# Patient Record
Sex: FEMALE | Race: ASIAN | NOT HISPANIC OR LATINO | Employment: FULL TIME
[De-identification: names, ages, dates, MRNs, and addresses within clinical notes are randomized per-mention and may not be internally consistent; named-entity substitution may affect disease eponyms.]

---

## 2017-07-08 ENCOUNTER — HEALTH MAINTENANCE LETTER (OUTPATIENT)
Age: 22
End: 2017-07-08

## 2017-07-22 ENCOUNTER — HEALTH MAINTENANCE LETTER (OUTPATIENT)
Age: 22
End: 2017-07-22

## 2019-03-01 ENCOUNTER — OFFICE VISIT (OUTPATIENT)
Dept: FAMILY MEDICINE | Facility: CLINIC | Age: 24
End: 2019-03-01
Payer: COMMERCIAL

## 2019-03-01 VITALS
HEIGHT: 63 IN | RESPIRATION RATE: 16 BRPM | OXYGEN SATURATION: 99 % | WEIGHT: 114 LBS | BODY MASS INDEX: 20.2 KG/M2 | TEMPERATURE: 98.5 F | DIASTOLIC BLOOD PRESSURE: 81 MMHG | HEART RATE: 86 BPM | SYSTOLIC BLOOD PRESSURE: 113 MMHG

## 2019-03-01 DIAGNOSIS — R63.0 ANOREXIA: ICD-10-CM

## 2019-03-01 DIAGNOSIS — Z00.00 ENCOUNTER FOR ROUTINE ADULT HEALTH EXAMINATION WITHOUT ABNORMAL FINDINGS: Primary | ICD-10-CM

## 2019-03-01 PROCEDURE — 36415 COLL VENOUS BLD VENIPUNCTURE: CPT | Performed by: FAMILY MEDICINE

## 2019-03-01 PROCEDURE — 84100 ASSAY OF PHOSPHORUS: CPT | Performed by: FAMILY MEDICINE

## 2019-03-01 PROCEDURE — 99385 PREV VISIT NEW AGE 18-39: CPT | Performed by: FAMILY MEDICINE

## 2019-03-01 PROCEDURE — 83735 ASSAY OF MAGNESIUM: CPT | Performed by: FAMILY MEDICINE

## 2019-03-01 PROCEDURE — 80053 COMPREHEN METABOLIC PANEL: CPT | Performed by: FAMILY MEDICINE

## 2019-03-01 RX ORDER — DEXTROAMPHETAMINE SACCHARATE, AMPHETAMINE ASPARTATE, DEXTROAMPHETAMINE SULFATE AND AMPHETAMINE SULFATE 2.5; 2.5; 2.5; 2.5 MG/1; MG/1; MG/1; MG/1
10 TABLET ORAL 2 TIMES DAILY
COMMUNITY
End: 2023-10-24

## 2019-03-01 RX ORDER — PRAZOSIN HYDROCHLORIDE 1 MG/1
2 CAPSULE ORAL AT BEDTIME
COMMUNITY
End: 2023-10-24

## 2019-03-01 ASSESSMENT — MIFFLIN-ST. JEOR: SCORE: 1246.23

## 2019-03-01 NOTE — NURSING NOTE
"Chief Complaint   Patient presents with     Physical     initial /81 (BP Location: Left arm, Cuff Size: Adult Regular)   Pulse 86   Temp 98.5  F (36.9  C) (Oral)   Resp 16   Ht 1.6 m (5' 3\")   Wt 51.7 kg (114 lb)   LMP 02/09/2019   SpO2 99%   BMI 20.19 kg/m   Estimated body mass index is 20.19 kg/m  as calculated from the following:    Height as of this encounter: 1.6 m (5' 3\").    Weight as of this encounter: 51.7 kg (114 lb).  BP completed using cuff size: regular.  L  arm      Health Maintenance that is potentially due pending provider review:  NONE    n/a    Darian Healy ma  "

## 2019-03-01 NOTE — PROGRESS NOTES
SUBJECTIVE:   CC: Toshia Claros is an 23 year old woman who presents for preventive health visit.     Healthy Habits:    Do you get at least three servings of calcium containing foods daily (dairy, green leafy vegetables, etc.)? yes and tries    Amount of exercise or daily activities, outside of work: 3 day(s) per week    Problems taking medications regularly No    Medication side effects: No    Have you had an eye exam in the past two years? yes    Do you see a dentist twice per year? yes    Do you have sleep apnea, excessive snoring or daytime drowsiness?no    Establishing care- previously seen by pediatrician, but now for awhile.  [facility]es to [facility]- has been going for about a year this time.  Outpt program- 1x/wk for therapy, 1x/wk for dietician.  Doing 'not awesome, not horrible'.  Dx with anorexia at 17yrs, sx's started at ~13 yrs.  She's done better in the past, not doing as well for months.  More restricting.  Last hospitalized 2 1/2 yrs ago, for 4 wks[facility] [place].  Wt was ~90 lbs when she was hospitalized.  Goal- not wt goal, more intake goals.    Labs- last done likely ~2 1/2 yrs ago when she was hospitalized- had issues with her kidneys, mostly needed monitoring, no IVF or dialysis.      Exercise- goes for a walk, doesn't go to the gym.  Has over-exercised in the past, but not for awhile.    Social-   [employer]- 3rd, biology major.  Looking at health/medical field.    Meds-   Prazosin and adderall- psychiatrist [facility] [place].  Seen by him q2-3 months.  Dr. Puente.   She's likely retiring at the end of the year, so she'll work with her to find a new psychiatrist.      Today's PHQ-2 Score: No flowsheet data found.    Abuse: Current or Past(Physical, Sexual or Emotional)- No  Do you feel safe in your environment? Yes    Social History     Tobacco Use     Smoking status: Never Smoker     Smokeless tobacco: Never Used    Substance Use Topics     Alcohol use: No     If you drink alcohol do you typically have >3 drinks per day or >7 drinks per week? No                     Reviewed orders with patient.  Reviewed health maintenance and updated orders accordingly - Yes    Labs reviewed in EPIC  BP Readings from Last 3 Encounters:   03/01/19 113/81   09/14/15 102/73    Wt Readings from Last 3 Encounters:   03/01/19 51.7 kg (114 lb)   09/14/15 57.1 kg (125 lb 14.4 oz) (48 %)*     * Growth percentiles are based on CDC (Girls, 2-20 Years) data.                  There is no problem list on file for this patient.    History reviewed. No pertinent surgical history.    Social History     Tobacco Use     Smoking status: Never Smoker     Smokeless tobacco: Never Used   Substance Use Topics     Alcohol use: No     History reviewed. No pertinent family history.      Current Outpatient Medications   Medication Sig Dispense Refill     amphetamine-dextroamphetamine (ADDERALL) 10 MG tablet Take 10 mg by mouth 2 times daily       prazosin (MINIPRESS) 1 MG capsule Take 2 mg by mouth At Bedtime       No Known Allergies  No lab results found.     Mammogram not appropriate for this patient based on age.    Pertinent mammograms are reviewed under the imaging tab.  History of abnormal Pap smear: NO - age 21-29 PAP every 3 years recommended     Reviewed and updated as needed this visit by clinical staff  Tobacco  Allergies  Meds  Problems  Med Hx  Surg Hx  Fam Hx         Reviewed and updated as needed this visit by Provider  Tobacco  Allergies  Meds  Problems  Med Hx  Surg Hx  Fam Hx            ROS:  10 point ROS of systems including Constitutional, Eyes, Respiratory, Cardiovascular, Gastroenterology, Genitourinary, Integumentary, Muscularskeletal, Psychiatric were all negative except for pertinent positives noted in my HPI.      OBJECTIVE:   /81 (BP Location: Left arm, Cuff Size: Adult Regular)   Pulse 86   Temp 98.5  F (36.9  C) (Oral)  "  Resp 16   Ht 1.6 m (5' 3\")   Wt 51.7 kg (114 lb)   LMP 02/09/2019   SpO2 99%   BMI 20.19 kg/m    EXAM:  GENERAL: healthy, alert and no distress  EYES: Eyes grossly normal to inspection, PERRL and conjunctivae and sclerae normal  HENT: ear canals and TM's normal, nose and mouth without ulcers or lesions  NECK: no adenopathy, no asymmetry, masses, or scars and thyroid normal to palpation  RESP: lungs clear to auscultation - no rales, rhonchi or wheezes  BREAST: normal without masses, tenderness or nipple discharge and no palpable axillary masses or adenopathy  CV: regular rate and rhythm, normal S1 S2, no S3 or S4, no murmur, click or rub, no peripheral edema and peripheral pulses strong  ABDOMEN: soft, nontender, no hepatosplenomegaly, no masses and bowel sounds normal  MS: no gross musculoskeletal defects noted, no edema  SKIN: no suspicious lesions or rashes  NEURO: Normal strength and tone, mentation intact and speech normal  PSYCH: mentation appears normal, affect normal/bright    Diagnostic Test Results:  No results found for this or any previous visit (from the past 24 hour(s)).    ASSESSMENT/PLAN:       ICD-10-CM    1. Encounter for routine adult health examination without abnormal findings Z00.00    2. Anorexia R63.0 Comprehensive metabolic panel (BMP + Alb, Alk Phos, ALT, AST, Total. Bili, TP)     Phosphorus     Magnesium     CPE- Discussed diet, calcium and exercise.  Went over Self Breast Exam.  Thin prep pap was not done- pt declined it today, but will try and have it done next year.  She is not currently sexually active.  Eyes and Teeth or UTD or recommended f/u.  No immunizations needed today- she did not get flu shot this year, but would like to skip due to it being so late in the season.  See orders below for tests ordered and screening needed.      Anorexia- Currently at the [facility] for the past year- doing outpt program, sees therapist 1x/wk and dietician 1x/wk.  She was last " "hospitalized in ~2017, wt down to 90 lbs at that time.  She feels she's doing okay, not great.  Told she should come establish care and have labs done.  Doesn't think she's had labs done for a couple yrs- possible since around the time of her hospitalization.  Will check labs as above.  F/u q6mo if doing well/stable, sooner if lab abnls or worsening sx's.    Adderall/Prazosin- cont f/u with psychiatry.      COUNSELING:   Reviewed preventive health counseling, as reflected in patient instructions    BP Readings from Last 1 Encounters:   03/01/19 113/81     Estimated body mass index is 20.19 kg/m  as calculated from the following:    Height as of this encounter: 1.6 m (5' 3\").    Weight as of this encounter: 51.7 kg (114 lb).           reports that  has never smoked. she has never used smokeless tobacco.      Counseling Resources:  ATP IV Guidelines  Pooled Cohorts Equation Calculator  Breast Cancer Risk Calculator  FRAX Risk Assessment  ICSI Preventive Guidelines  Dietary Guidelines for Americans, 2010  USDA's MyPlate  ASA Prophylaxis  Lung CA Screening    Екатерина Cool MD  [facility]  "

## 2019-03-04 LAB
ALBUMIN SERPL-MCNC: 4.5 G/DL (ref 3.4–5)
ALP SERPL-CCNC: 58 U/L (ref 40–150)
ALT SERPL W P-5'-P-CCNC: 12 U/L (ref 0–50)
ANION GAP SERPL CALCULATED.3IONS-SCNC: 7 MMOL/L (ref 3–14)
AST SERPL W P-5'-P-CCNC: 11 U/L (ref 0–45)
BILIRUB SERPL-MCNC: 0.6 MG/DL (ref 0.2–1.3)
BUN SERPL-MCNC: 10 MG/DL (ref 7–30)
CALCIUM SERPL-MCNC: 9.4 MG/DL (ref 8.5–10.1)
CHLORIDE SERPL-SCNC: 106 MMOL/L (ref 94–109)
CO2 SERPL-SCNC: 27 MMOL/L (ref 20–32)
CREAT SERPL-MCNC: 0.72 MG/DL (ref 0.52–1.04)
GFR SERPL CREATININE-BSD FRML MDRD: >90 ML/MIN/{1.73_M2}
GLUCOSE SERPL-MCNC: 85 MG/DL (ref 70–99)
MAGNESIUM SERPL-MCNC: 2.2 MG/DL (ref 1.6–2.3)
PHOSPHATE SERPL-MCNC: 3 MG/DL (ref 2.5–4.5)
POTASSIUM SERPL-SCNC: 3.6 MMOL/L (ref 3.4–5.3)
PROT SERPL-MCNC: 7.6 G/DL (ref 6.8–8.8)
SODIUM SERPL-SCNC: 140 MMOL/L (ref 133–144)

## 2019-03-04 NOTE — RESULT ENCOUNTER NOTE
Here are your lab results from your recent visit...  -Your CMP (which includes electrolyte levels, blood sugar levels, and kidney and liver function tests) looks normal.  -Your phosphorus and magnesium levels are also normal.    Please let me know if you have any questions.  Best,   Nader Cool MD

## 2019-04-04 ENCOUNTER — OFFICE VISIT (OUTPATIENT)
Dept: FAMILY MEDICINE | Facility: CLINIC | Age: 24
End: 2019-04-04
Payer: COMMERCIAL

## 2019-04-04 VITALS
OXYGEN SATURATION: 98 % | DIASTOLIC BLOOD PRESSURE: 75 MMHG | TEMPERATURE: 98 F | RESPIRATION RATE: 14 BRPM | SYSTOLIC BLOOD PRESSURE: 110 MMHG | HEIGHT: 63 IN | BODY MASS INDEX: 20.95 KG/M2 | WEIGHT: 118.25 LBS | HEART RATE: 93 BPM

## 2019-04-04 DIAGNOSIS — N94.6 DYSMENORRHEA: Primary | ICD-10-CM

## 2019-04-04 PROBLEM — R10.2 PELVIC PAIN IN FEMALE: Status: ACTIVE | Noted: 2019-04-04

## 2019-04-04 LAB
ALBUMIN UR-MCNC: NEGATIVE MG/DL
APPEARANCE UR: CLEAR
BILIRUB UR QL STRIP: NEGATIVE
COLOR UR AUTO: YELLOW
GLUCOSE UR STRIP-MCNC: NEGATIVE MG/DL
HCG UR QL: NEGATIVE
HGB UR QL STRIP: NEGATIVE
KETONES UR STRIP-MCNC: ABNORMAL MG/DL
LEUKOCYTE ESTERASE UR QL STRIP: NEGATIVE
NITRATE UR QL: NEGATIVE
PH UR STRIP: 5.5 PH (ref 5–7)
RBC #/AREA URNS AUTO: ABNORMAL /HPF
SOURCE: ABNORMAL
SP GR UR STRIP: 1.02 (ref 1–1.03)
UROBILINOGEN UR STRIP-ACNC: 0.2 EU/DL (ref 0.2–1)
WBC #/AREA URNS AUTO: ABNORMAL /HPF

## 2019-04-04 PROCEDURE — 87491 CHLMYD TRACH DNA AMP PROBE: CPT | Performed by: FAMILY MEDICINE

## 2019-04-04 PROCEDURE — 81025 URINE PREGNANCY TEST: CPT | Performed by: FAMILY MEDICINE

## 2019-04-04 PROCEDURE — 81001 URINALYSIS AUTO W/SCOPE: CPT | Performed by: FAMILY MEDICINE

## 2019-04-04 PROCEDURE — 99214 OFFICE O/P EST MOD 30 MIN: CPT | Performed by: FAMILY MEDICINE

## 2019-04-04 ASSESSMENT — PAIN SCALES - GENERAL: PAINLEVEL: NO PAIN (0)

## 2019-04-04 ASSESSMENT — MIFFLIN-ST. JEOR: SCORE: 1265.51

## 2019-04-04 NOTE — NURSING NOTE
"Chief Complaint   Patient presents with     Abdominal Pain     lower abdomen pain     /75 (BP Location: Right arm, Patient Position: Sitting, Cuff Size: Adult Regular)   Pulse 93   Temp 98  F (36.7  C) (Oral)   Resp 14   Ht 1.6 m (5' 3\")   Wt 53.6 kg (118 lb 4 oz)   LMP 04/04/2019 (Exact Date)   SpO2 98%   Breastfeeding? No   BMI 20.95 kg/m   Estimated body mass index is 20.95 kg/m  as calculated from the following:    Height as of this encounter: 1.6 m (5' 3\").    Weight as of this encounter: 53.6 kg (118 lb 4 oz).  bp completed using cuff size: regular      Health Maintenance addressed:  NONE    n/a              "

## 2019-04-04 NOTE — RESULT ENCOUNTER NOTE
Pleasure meeting you today  Urine pregnancy test negative  Urine is clear- no signs of urinary tract infection  Chlamydia screening is send out    Pain mostly from periods  Keep menstrual calender  Proceed with ultrasound if pain is worsening- Call to schedule your imaging:  [place] or [facility] [phone]  Or [facility] [phone].    Please keep us posted with questions or concerns .      Best Regards,    Jessica Burciaga MD  [facility]  [phone]

## 2019-04-04 NOTE — PROGRESS NOTES
"  SUBJECTIVE:   Toshia Claros is a 23 year old female who presents to clinic today for the following health issues:      pelvic Pain      Duration: yesterday morning was worse, could not move- until took ibuprofen     Periods started today- normally has mild cramps     Description (location/character/radiation): pelvic bilateral        Associated flank pain: None    Intensity:  moderate    Accompanying signs and symptoms:        Fever/Chills: no        Gas/Bloating: YES- bloating       Nausea/vomitting: YES- nausea yesterday morning       Diarrhea: no        Dysuria or Hematuria: no     History (previous similar pain/trauma/previous testing): none    Precipitating or alleviating factors:       Pain worse with eating/BM/urination: no but pt mentions being lactose       Pain relieved by BM: no     Therapies tried and outcome: IBU and Aleeve    LMP: 04/04/2019    Periods irregular     Abstinence    Pap smear declined by patient    Ok to get urine tests today.      PROBLEMS TO ADD ON...    Problem list and histories reviewed & adjusted, as indicated.  Additional history: as documented    Patient Active Problem List   Diagnosis     Pelvic pain in female     No past surgical history on file.    Social History     Tobacco Use     Smoking status: Never Smoker     Smokeless tobacco: Never Used   Substance Use Topics     Alcohol use: No     No family history on file.        Reviewed and updated as needed this visit by clinical staff  Tobacco  Allergies  Meds       Reviewed and updated as needed this visit by Provider         ROS:  Constitutional, HEENT, cardiovascular, pulmonary, GI, , musculoskeletal, neuro, skin, endocrine and psych systems are negative, except as otherwise noted.    OBJECTIVE:     /75 (BP Location: Right arm, Patient Position: Sitting, Cuff Size: Adult Regular)   Pulse 93   Temp 98  F (36.7  C) (Oral)   Resp 14   Ht 1.6 m (5' 3\")   Wt 53.6 kg (118 lb 4 oz)   LMP 04/04/2019 (Exact Date)  "  SpO2 98%   Breastfeeding? No   BMI 20.95 kg/m    Body mass index is 20.95 kg/m .  GENERAL: healthy, alert and no distress  NECK: no adenopathy, no asymmetry, masses, or scars and thyroid normal to palpation  RESP: lungs clear to auscultation - no rales, rhonchi or wheezes  CV: regular rate and rhythm, normal S1 S2, no S3 or S4, no murmur, click or rub, no peripheral edema and peripheral pulses strong  ABDOMEN: soft, nontender, no hepatosplenomegaly, no masses and bowel sounds normal  : normal menstrual blood. Cervix visualized. Non bluky uterus on palpation. No cervical motion tenderness    Diagnostic Test Results:  No results found for this or any previous visit (from the past 24 hour(s)).    ASSESSMENT/PLAN:   1. Dysmenorrhea  Assessment: 22 yo female with pelvic cramping & normal mensuration  Plan urine pregnancy test is negative   Advised to keep menstrual calender and track days  Urine is clear  Screening for chlamydia & gonorrhea is negative  She reports she has not had that much cramping before.  Advised to take over the counter NSAID with meals   If more concerns ok to proceed with ultrasound pelvis  Follow up in 1 weeks for unresolved concerns    - HCG Qual, Urine ([id number])  - UA with Microscopic  - Chlamydia trachomatis PCR  - US Pelvic Complete w Transvaginal; Future      The patient indicates understanding of these issues and agrees with the plan.    Jessica Burciaga MD  [facility]

## 2019-04-07 LAB
C TRACH DNA SPEC QL NAA+PROBE: NEGATIVE
SPECIMEN SOURCE: NORMAL

## 2019-04-11 NOTE — PATIENT INSTRUCTIONS
urine pregnancy test is negative    keep menstrual calender and track days  Urine is clear- no signs of urinary tract infection   Screening for chlamydia & gonorrhea is negative    If more concerns ok to proceed with ultrasound pelvis  Call to schedule your imaging:    [place] or [facility] [phone]    [facility] [phone]    For menstrual cramping ok to take over the counter pain medications ibuprofen 600 mg three times daily as needed       Follow up in 1 weeks for unresolved concerns

## 2019-10-04 ENCOUNTER — HEALTH MAINTENANCE LETTER (OUTPATIENT)
Age: 24
End: 2019-10-04

## 2020-01-16 ENCOUNTER — MYC MEDICAL ADVICE (OUTPATIENT)
Dept: FAMILY MEDICINE | Facility: CLINIC | Age: 25
End: 2020-01-16

## 2020-01-16 NOTE — TELEPHONE ENCOUNTER
Reviewed letter.  Can you send it on to the MA when it is determined who is likely to be working with me tomorrow?  Thanks!  CW

## 2020-01-17 ENCOUNTER — TELEPHONE (OUTPATIENT)
Dept: PHYSICAL MEDICINE AND REHAB | Facility: CLINIC | Age: 25
End: 2020-01-17

## 2020-01-17 ENCOUNTER — OFFICE VISIT (OUTPATIENT)
Dept: FAMILY MEDICINE | Facility: CLINIC | Age: 25
End: 2020-01-17
Payer: COMMERCIAL

## 2020-01-17 VITALS
RESPIRATION RATE: 14 BRPM | HEIGHT: 63 IN | BODY MASS INDEX: 20.02 KG/M2 | DIASTOLIC BLOOD PRESSURE: 78 MMHG | WEIGHT: 113 LBS | TEMPERATURE: 97.9 F | OXYGEN SATURATION: 100 % | HEART RATE: 104 BPM | SYSTOLIC BLOOD PRESSURE: 109 MMHG

## 2020-01-17 DIAGNOSIS — M24.9 HYPERMOBILE JOINTS: ICD-10-CM

## 2020-01-17 DIAGNOSIS — M25.50 MULTIPLE JOINT PAIN: Primary | ICD-10-CM

## 2020-01-17 DIAGNOSIS — Z23 FLU VACCINE NEED: ICD-10-CM

## 2020-01-17 DIAGNOSIS — R63.0 ANOREXIA: ICD-10-CM

## 2020-01-17 DIAGNOSIS — F43.10 PTSD (POST-TRAUMATIC STRESS DISORDER): ICD-10-CM

## 2020-01-17 PROCEDURE — 90686 IIV4 VACC NO PRSV 0.5 ML IM: CPT | Performed by: FAMILY MEDICINE

## 2020-01-17 PROCEDURE — 99214 OFFICE O/P EST MOD 30 MIN: CPT | Mod: 25 | Performed by: FAMILY MEDICINE

## 2020-01-17 PROCEDURE — 90471 IMMUNIZATION ADMIN: CPT | Performed by: FAMILY MEDICINE

## 2020-01-17 ASSESSMENT — MIFFLIN-ST. JEOR: SCORE: 1236.69

## 2020-01-17 ASSESSMENT — PAIN SCALES - GENERAL: PAINLEVEL: MILD PAIN (3)

## 2020-01-17 NOTE — TELEPHONE ENCOUNTER
Health Call Center    Phone Message    May a detailed message be left on voicemail: yes    Reason for Call: Other: Please call pt to schedule her with a provider who will focus on joint/muscular skeleton issues v. stroke issues.  pt needs no help in stroke and she wanted the team to know that. Please call pt to schedule this consult appt for her. Thank you.     Action Taken: Message routed to:  [facility]

## 2020-01-17 NOTE — PROGRESS NOTES
Subjective   Toshia Claros is a 24 year old female who presents to clinic today for the following health issues:    HPI   Musculoskeletal problem/pain    Duration: on and off for years, but has gotten worse recently     Description  Location: knees, hips, shoulders    Intensity:  moderate    Accompanying signs and symptoms: none    History  Previous similar problem: YES  Previous evaluation:  none    Precipitating or alleviating factors:  Trauma or overuse: no   Aggravating factors include: on feet for a long period     Therapies tried and outcome: acetaminophen and Ibuprofen    She's been having increasing joint pain.  Mild/mod on normal days, and then if she 'overdoes it', she's up at night, unable to fall asleep as it hurts too bad.  Ibuprofen trials, but doesn't help much.    Usual exercise- goes for walks, stretches.    Avoids high-impact activity, partly because of joints.  She did ml-running in HS, but had knee/ankle pain.    Sx's of pain in joints of knees, hips, shoulders, sometimes her wrists.  Both sides, but left side is more frequent, jts seem more hypermobile in general.  Pain tends to be worse if she's on her feet more- in knees and hips.  About the same throughout the day otherwise.  No am stiffness.  No redness or swelling.  Pain definitely located in her jts, not muscles or bones.    Hypermobility- she's always had hypermobile jts. Joints just seem to bend too far, since she was a child.   She can pull her shoulders down and seemingly out of the socket and back in easily- doesn't hurt.  Also sometimes feels her hip goes out of the socket when she's walking, but then easily goes back in.  No painful dislocations, or inability to get it back in.    H/o R ankle fx- ~ age 21.  'Just started hurting'.  Stress fracture.  During eating disorder sx's- moderate sx's, not the most severe, was having periods at the time.    Therapist wants her to ask if sleeping on hard floors would impact it.  She has  "been sleeping on hard floors for over a year.  She doesn't notice that sx's are worse when she's on the floor or in the morning.  She has a bed and couch, just doesn't want to sleep in them.    Of note, her psychiatrist retired.  Has some refills.  Needs to call to establish care with another psychiatrist.      Patient Active Problem List   Diagnosis     Pelvic pain in female     PTSD (post-traumatic stress disorder)     Anorexia     History reviewed. No pertinent surgical history.    Social History     Tobacco Use     Smoking status: Never Smoker     Smokeless tobacco: Never Used   Substance Use Topics     Alcohol use: No     Family History   Adopted: Yes         Current Outpatient Medications   Medication Sig Dispense Refill     amphetamine-dextroamphetamine (ADDERALL) 10 MG tablet Take 10 mg by mouth 2 times daily       prazosin (MINIPRESS) 1 MG capsule Take 2 mg by mouth At Bedtime       No Known Allergies  Recent Labs   Lab Test 03/01/19  1601   ALT 12   CR 0.72   GFRESTIMATED >90   GFRESTBLACK >90   POTASSIUM 3.6      BP Readings from Last 3 Encounters:   01/17/20 109/78   04/04/19 110/75   03/01/19 113/81    Wt Readings from Last 3 Encounters:   01/17/20 51.3 kg (113 lb)   04/04/19 53.6 kg (118 lb 4 oz)   03/01/19 51.7 kg (114 lb)                      Reviewed and updated as needed this visit by Provider  Tobacco  Allergies  Meds  Problems  Med Hx  Surg Hx  Fam Hx         Review of Systems   ROS COMP: Constitutional, HEENT, cardiovascular, pulmonary, gi and gu systems are negative, except as otherwise noted.        Objective    /78 (BP Location: Left arm, Patient Position: Sitting, Cuff Size: Adult Regular)   Pulse 104   Temp 97.9  F (36.6  C) (Oral)   Resp 14   Ht 1.6 m (5' 3\")   Wt 51.3 kg (113 lb)   LMP 01/10/2020 (Approximate)   SpO2 100%   BMI 20.02 kg/m    Body mass index is 20.02 kg/m .  Physical Exam   GENERAL APPEARANCE: healthy, alert and no distress  EYES: Eyes grossly normal " to inspection, PERRL and conjunctivae and sclerae normal  HENT: ear canals and TM's normal and nose and mouth without ulcers or lesions  NECK: no adenopathy, no asymmetry, masses, or scars and thyroid normal to palpation  RESP: lungs clear to auscultation - no rales, rhonchi or wheezes  CV: regular rates and rhythm, normal S1 S2, no S3 or S4 and no murmur, click or rub  MS: extremities normal- no gross deformities noted  ORTHO: shoulders normal to inspection, though she is able to lower humerus head downward ~4cm below usual placement, normal ROM of shoulders, hips and knees, with normal strength.  No hypermobility signs on hands.  SKIN: no suspicious lesions or rashes        Assessment & Plan       ICD-10-CM    1. Multiple joint pain M25.50 PHYSIATRY REFERRAL   2. Hypermobile joints M24.9    3. Flu vaccine need Z23 HC FLU VAC PRESRV FREE QUAD SPLIT VIR > 6 MONTHS IM   4. PTSD (post-traumatic stress disorder) F43.10    5. Anorexia R63.0      Joint pains/hypermobility- Long-standing sx's of hypermobile joints, now with hip/knee/shoulder pains, L>R, which she feels is mostly related to their hypermobility.  No excessive exercise (mostly walking), avoids partly due to this history.  No inflammatory-like sx's (and unknown fam h/o), so will not get rheumatologic labs.  Will send on to [facility] for evaluation and to discuss if there are any treatment options.    Flu vaccine- Risks/benefits discussed, given today.     PTSD/Anorexia- pt continues to see therapist and dietician at [facility].  Her psychiatrist recently retired, though, so needs to call to establish care with a new one.    Return in about 4 months (around 5/17/2020) for Physical-but return soon if symptoms not improving.    Екатерина Cool MD  [facility]

## 2020-01-31 ENCOUNTER — TRANSFERRED RECORDS (OUTPATIENT)
Dept: HEALTH INFORMATION MANAGEMENT | Facility: CLINIC | Age: 25
End: 2020-01-31

## 2020-01-31 NOTE — TELEPHONE ENCOUNTER
Patient verbalized that she would like a provider to address her joint problems and is not interested in any type of Physical medicine or rehabilitation. Offered patient suggestions of Rheumatology, Orthopedics and possibly the pain clinic. Encouraged her to discuss these options with her Primary provider. Patient in agreement with the plan.

## 2020-03-19 ENCOUNTER — TELEPHONE (OUTPATIENT)
Dept: FAMILY MEDICINE | Facility: CLINIC | Age: 25
End: 2020-03-19

## 2020-03-19 DIAGNOSIS — M25.50 MULTIPLE JOINT PAIN: Primary | ICD-10-CM

## 2020-03-19 DIAGNOSIS — M24.9 HYPERMOBILE JOINTS: ICD-10-CM

## 2020-03-19 NOTE — TELEPHONE ENCOUNTER
- Patient is calling to get a referral to Rheumatology. She called to make appt at [facility] she was told see need to see Rheumatology. She has made an appointment with [facility] and they are asking for a referral to be faxed to them. Please advise. Thanks    Itzel Espinosa  Referral coordinator

## 2020-04-08 ENCOUNTER — TRANSFERRED RECORDS (OUTPATIENT)
Dept: HEALTH INFORMATION MANAGEMENT | Facility: CLINIC | Age: 25
End: 2020-04-08

## 2020-04-08 LAB
ALT SERPL-CCNC: 10 IU/L (ref 5–35)
AST SERPL-CCNC: 14 U/L (ref 5–34)
CREATININE (EXTERNAL): 0.9 MG/DL (ref 0.5–1.3)

## 2020-05-04 ENCOUNTER — THERAPY VISIT (OUTPATIENT)
Dept: PHYSICAL THERAPY | Facility: CLINIC | Age: 25
End: 2020-05-04
Payer: COMMERCIAL

## 2020-05-04 DIAGNOSIS — M25.511 SHOULDER PAIN, BILATERAL: ICD-10-CM

## 2020-05-04 DIAGNOSIS — M25.552 PAIN OF BOTH HIP JOINTS: ICD-10-CM

## 2020-05-04 DIAGNOSIS — M25.551 PAIN OF BOTH HIP JOINTS: ICD-10-CM

## 2020-05-04 DIAGNOSIS — M25.512 SHOULDER PAIN, BILATERAL: ICD-10-CM

## 2020-05-04 PROCEDURE — 97112 NEUROMUSCULAR REEDUCATION: CPT | Mod: GP | Performed by: PHYSICAL THERAPIST

## 2020-05-04 PROCEDURE — 97161 PT EVAL LOW COMPLEX 20 MIN: CPT | Mod: GP | Performed by: PHYSICAL THERAPIST

## 2020-05-04 PROCEDURE — 97110 THERAPEUTIC EXERCISES: CPT | Mod: GP | Performed by: PHYSICAL THERAPIST

## 2020-05-04 NOTE — PROGRESS NOTES
[facility] Initial Evaluation  Subjective:  The history is provided by the patient.   Therapist Generated HPI Evaluation  Problem details: Pt presents to PT with a chief complaint of chronic bilateral shoulder and hip pain. Pt reports a chronic history of joint pain and hypermobility since childhood, but reports worsening sxs over the past years. Pt evaluated by rheumatology and diagnosed with hypermobility syndrome and referred to PT. Shoulder pain reported to be worse with reaching overhead, lifting, pushing/pulling, and laying on involved side. Hip pain worse with prolonged standing/walking. Pt reports a chronic hx of shoulder subluxations. .         Type of problem:  Bilateral shoulders.    This is a chronic condition.  Condition occurred with:  Unknown cause.  Where condition occurred: for unknown reasons.  Patient reports pain:  Lateral.  Pain is described as aching and is intermittent.  Pain radiates to:  Upper arm. Pain is worse in the P.M..  Since onset symptoms are unchanged.  Associated symptoms:  Dislocating/subluxing, loss of strength and catching. Symptoms are exacerbated by carrying, lying on extremity, using arm at shoulder level, using arm overhead and lifting  and relieved by NSAID's.      Restrictions due to condition include:  Working in normal job without restrictions.  Barriers include:  None as reported by patient.    Therapist Generated HPI Evaluation         Type of problem:  Bilateral hips.    This is a chronic condition.  Condition occurred with:  Insidious onset.  Where condition occurred: for unknown reasons.  Patient reports pain:  Anterior and lateral (pelvis, iliac crest ).  Pain is described as aching and is intermittent.  Pain is worse in the P.M..  Since onset symptoms are unchanged.  Associated symptoms:  Loss of strength and buckling/giving out. Symptoms are exacerbated by bending/squatting, ascending stairs, descending stairs, standing and walking  and  relieved by NSAID's.      Restrictions due to condition include:  Working in normal job without restrictions.  Barriers include:  None as reported by patient.    Patient Health History  Symptom: Hypermobility; EDS.     Date of Onset: childhood; MD visit 04/2020.      Pain is reported as 4/10 on pain scale.  General health as reported by patient is good.  Pertinent medical history includes: depression and mental illness.   Red flags:  None as reported by patient.  Medical allergies: none.   Surgeries include:  None.    Current medications:  None.    Occupation: student.   Primary job tasks include:  Computer work and prolonged sitting.                                    Objective:  System                   Shoulder Evaluation:  ROM:  AROM:  normal (excessive shoulder elevation bilaterally )                                  Strength:    Flexion: Left:4+/5    Pain: +    Right: 4+/5      Pain:  +    Abduction:  Left: 4+/5   Pain:+    Right: 4+/5      Pain:+    Internal Rotation:  Left:5-/5     Pain:    Right: 5-/5     Pain:  External Rotation:   Left:4/5     Pain:   Right:4/5     Pain:            Stability Testing:    Left shoulder stability positive testing:  Sulcus sign  Right shoulder stability positive testing:Sulcus sign      Mobility Tests:    Glenohumeral anterior left:  Hypermobile  Glenohumeral anterior right:  Hypermobile  Glenohumeral posterior left:  Hypermobile  Glenohumeral posterior right:  Hypermobile                                  Hip Evaluation  HIP AROM:  AROM:    Left Hip:     Normal    Right Hip:   Normal                  Hip PROM:              External Rotation: Left: 90    Right: 90              Hip Strength:    Flexion:   Left: 4+/5   +  Pain:  Right: 4+/5   +  Pain:                    Extension:  Left: 5-/5  Pain:Right: 5-/5    Pain:    Abduction:  Left: 4/5     Pain:Right: 4/5    Pain:                                 General     ROS    Assessment/Plan:    Patient is a 24 year old female with  both sides shoulder and both sides hip complaints.    Patient has the following significant findings with corresponding treatment plan.                Diagnosis 1:  Hypermobility Syndrome  Therapy Evaluation Codes:   1) History comprised of:   Personal factors that impact the plan of care:      Past/current experiences.    Comorbidity factors that impact the plan of care are:      Depression and Mental illness.     Medications impacting care: Anti-depressant and Anti-inflammatory.  2) Examination of Body Systems comprised of:   Body structures and functions that impact the plan of care:      Hip and Shoulder.   Activity limitations that impact the plan of care are:      Dressing, Lifting, Squatting/kneeling, Stairs and Standing.  3) Clinical presentation characteristics are:   Evolving/Changing.  4) Decision-Making    Moderate complexity using standardized patient assessment instrument and/or measureable assessment of functional outcome.  Cumulative Therapy Evaluation is: Moderate complexity.    Previous and current functional limitations:  (See Goal Flow Sheet for this information)    Short term and Long term goals: (See Goal Flow Sheet for this information)     Communication ability:  Patient appears to be able to clearly communicate and understand verbal and written communication and follow directions correctly.  Treatment Explanation - The following has been discussed with the patient:   RX ordered/plan of care  Anticipated outcomes  Possible risks and side effects  This patient would benefit from PT intervention to resume normal activities.   Rehab potential is good.    Frequency:  1 X week, once daily  Duration:  for 6 weeks tapering to 2 x month for 1 month  Discharge Plan:  Achieve all LTG.  Independent in home treatment program.  Reach maximal therapeutic benefit.    Please refer to the daily flowsheet for treatment today, total treatment time and time spent performing 1:1 timed codes.

## 2020-05-11 ENCOUNTER — TRANSFERRED RECORDS (OUTPATIENT)
Dept: HEALTH INFORMATION MANAGEMENT | Facility: CLINIC | Age: 25
End: 2020-05-11

## 2020-05-18 ENCOUNTER — TRANSFERRED RECORDS (OUTPATIENT)
Dept: HEALTH INFORMATION MANAGEMENT | Facility: CLINIC | Age: 25
End: 2020-05-18

## 2020-05-18 LAB
ALT SERPL-CCNC: 17 U/L (ref 0–78)
AST SERPL-CCNC: 14 U/L (ref 0–37)
CREAT SERPL-MCNC: 0.91 MG/DL (ref 0.6–1.02)
GFR SERPL CREATININE-BSD FRML MDRD: >60 ML/MIN/1.73M2
GLUCOSE SERPL-MCNC: 90 MG/DL (ref 74–100)
POTASSIUM SERPL-SCNC: 3.8 MMOL/L (ref 3.5–5.1)
TSH SERPL-ACNC: 3.02 UIU/ML (ref 0.36–5)

## 2020-05-20 ENCOUNTER — VIRTUAL VISIT (OUTPATIENT)
Dept: PHYSICAL THERAPY | Facility: CLINIC | Age: 25
End: 2020-05-20
Payer: COMMERCIAL

## 2020-05-20 DIAGNOSIS — M25.551 PAIN OF BOTH HIP JOINTS: ICD-10-CM

## 2020-05-20 DIAGNOSIS — M25.512 SHOULDER PAIN, BILATERAL: ICD-10-CM

## 2020-05-20 DIAGNOSIS — M25.511 SHOULDER PAIN, BILATERAL: ICD-10-CM

## 2020-05-20 DIAGNOSIS — M25.552 PAIN OF BOTH HIP JOINTS: ICD-10-CM

## 2020-05-20 PROCEDURE — 97110 THERAPEUTIC EXERCISES: CPT | Mod: 95 | Performed by: PHYSICAL THERAPIST

## 2020-05-20 NOTE — PROGRESS NOTES
"Physical Therapy Virtual Follow Up Visit      The patient has been notified of following:     \"This virtual visit will be conducted between you and your provider. We have found that certain health care needs can be provided without the need for physical presence.  This service lets us provide the care you need with a virtual visit.\"    Due to external, as well as internal [facility] management of the COVID-19 Virus, Toshia Claros was not seen in our clinic.  As a substitution, we implemented a virtual visit to manage this patient's condition utilizing the "[employer]x virtual visit platform via the patient s existing code.  The provider, Margoth Bar, reviewed the patient's chart, PTRx prescription, and spoke with the patient to determine the following telemedicine visit is appropriate and effective for the patient's care.    The following type of visit was completed:   Video Visit:  The "[employer]x platform uses a synchronous HIPAA compliant video stream for this patient encounter.        S: Toshia Claros is a 25 year old female. Connected virtually on the PTRx platform to discuss their condition/progress. They noted improvements in strength and control in her exercises.  They noted ongoing pain or limitations with popping in her hips.     Current pain level: 3-4/10      O: Patient demonstrated a tendency to exaggerate posterior pelvic tilting when performing abdominal brace exercises. She was able to maintain a more neutral spine with cues.     PTRx Content from today's visit:  Exercise Name: Prone Scapula I, Sets: 2 set  - Reps: 10 reps  - Sessions: 1x day  Exercise Name: Bilateral Rotation With Theraband, Sets: 2 set  - Reps: 10 reps  - Sessions: 1x day, Notes: or sitting in a chair   Exercise Name: Supine Ceiling Punch, Sets: 2 - Reps: 10 reps  - Sessions: 1, Notes: angle slightly toward your hips, slowly lift and slowly lower   Exercise Name: Abdominal Brace Transverse Abdominis, Sets: 1 - Reps: 5 of 5 second " holds - Sessions: 1 x day, Notes: Focus on maintaining neutral pelvis and spine while activating your abdominals. Coordinate your breathing pattern  Exercise Name: Supine Abdominal Exercise #1 (Arm Extension), Sets: 2 - Reps: 10 reps - Sessions: 1  Exercise Name: Bridging #1, Sets: 2 - Reps: 15 reps, 5 sec holds  - Sessions: 1x day   Exercise Name: All 4s Rock Forward and Backward, Sets: 2 - Reps: 10, Notes: 1) press into your thumb and pointer finger to unlock your elbows  2) belly button to spine  3) shift forward and backward 2 inches  Exercise Name: Knee Bends, Sets: 2 - 10 Not on HEP due to crepitous in knees    A:   Patient is progressing as expected.  Response to therapy has shown an improvement in  muscle control      P: Patient will continue with the exercise program assigned on their PTRx code and will add the following measures to manage their pain/condition: all 4's rock forward     Current treatment program is being advanced to more complex exercises.      Virtual visit contact time    Time of service began: 9:23 AM  Time of service ended: 10:03 AM  Total Time for set up, visit, and documentation: 40 minutes    Payor: MEDICA / Plan: MEDICA CHOICE / Product Type: Indemnity /   .  Procedure Code/s   Therapeutic Exercise (05757): 40 minutes    I have reviewed the note as documented above.  This accurately captures the substance of my conversation with the patient.  Provider location: [place], [place] ([facility]/State)  Patient location: [place], [place]

## 2020-06-03 ENCOUNTER — VIRTUAL VISIT (OUTPATIENT)
Dept: PHYSICAL THERAPY | Facility: CLINIC | Age: 25
End: 2020-06-03
Payer: COMMERCIAL

## 2020-06-03 DIAGNOSIS — M25.551 PAIN OF BOTH HIP JOINTS: ICD-10-CM

## 2020-06-03 DIAGNOSIS — M25.552 PAIN OF BOTH HIP JOINTS: ICD-10-CM

## 2020-06-03 DIAGNOSIS — M25.511 SHOULDER PAIN, BILATERAL: ICD-10-CM

## 2020-06-03 DIAGNOSIS — M25.512 SHOULDER PAIN, BILATERAL: ICD-10-CM

## 2020-06-03 PROCEDURE — 97110 THERAPEUTIC EXERCISES: CPT | Mod: 95 | Performed by: PHYSICAL THERAPIST

## 2020-06-03 NOTE — PROGRESS NOTES
"Physical Therapy Virtual Follow Up Visit      The patient has been notified of following:     \"This virtual visit will be conducted between you and your provider. We have found that certain health care needs can be provided without the need for physical presence.  This service lets us provide the care you need with a virtual visit.\"    Due to external, as well as internal [facility] management of the COVID-19 Virus, Toshia Claros was not seen in our clinic.  As a substitution, we implemented a virtual visit to manage this patient's condition utilizing the [employer]x virtual visit platform via the patient s existing code.  The provider, Margoth Bar, reviewed the patient's chart, PTRx prescription, and spoke with the patient to determine the following telemedicine visit is appropriate and effective for the patient's care.    The following type of visit was completed:   Video Visit:  The [employer] platform uses a synchronous HIPAA compliant video stream for this patient encounter.        S: Toshia Claros is a 25 year old female. Connected virtually on the PTRx platform to discuss their condition/progress. They noted improvements in muscle control.  They noted ongoing pain or limitations with avoiding hyper extension of knees and elbows during weight bearing.     Current pain level: 3/10      O: Patient demonstrated scapular winging with fatigue during prone T drills     PTRx Content from today's visit:  Exercise Name: Prone Scapula T, Sets: 2 - Reps: 10 (working up to 20) - Sessions: 1  Exercise Name: Bilateral Rotation With Theraband, Sets: 2 set  - Reps: 10 reps  - Sessions: 1x day, Notes: or sitting in a chair   Exercise Name: Reverse Pendulum Supine or Sidelying, Sets: 2 - Reps: 6 in each patter - up/down, in and out, circles in each direction, Notes: punch slightly toward the ceiling  Exercise Name: Standing Fire Hydrant, Sets: 2 - Reps: 6 of each pattern, Notes: pillow on wall - make the same patterns as in " the shoulder circles  Exercise Name: Abdominal Brace Transverse Abdominis, Sets: 1 - Reps: 5 of 5 second holds - Sessions: 1 x day, Notes: Focus on maintaining neutral pelvis and spine while activating your abdominals. Coordinate your breathing pattern  Exercise Name: Supine Abdominal Exercise #1 (Arm Extension), Sets: 2 - Reps: 10 reps - Sessions: 1  Exercise Name: Bridging #1, Sets: 2 - Reps: 15 reps, 5 sec holds  - Sessions: 1x day , Notes: press lightly in your hands to protect your neck  Exercise Name: All 4s Rock Forward and Backward, Sets: 2 - Reps: 10, Notes: 1) press into your thumb and pointer finger to unlock your elbows  2) belly button to spine  3) shift forward and backward 2 inches  Exercise Name: Knee Bends, Sets: 2 - Reps: 20 reps - Sessions: 1x/day or every other day, Notes: Holding onto the sink or chair    A:   Patient is progressing as expected.  Response to therapy has shown an improvement in  strength, proprioception and muscle control      P: Patient will continue with the exercise program assigned on their PTRx code and will add the following measures to manage their pain/condition: prone T's, reverse pendulums, hip proprioception     Current treatment program is being advanced to more complex exercises.      Virtual visit contact time    Time of service began: 9:23 AM  Time of service ended: 10:03 AM  Total Time for set up, visit, and documentation: 42 minutes    Payor: MEDICA / Plan: MEDICA CHOICE / Product Type: Indemnity /   .  Procedure Code/s   Therapeutic Exercise (79432): 40 minutes    I have reviewed the note as documented above.  This accurately captures the substance of my conversation with the patient.  Provider location: [place], [place] ([facility]/State)  Patient location: [place], [place]

## 2020-06-17 ENCOUNTER — VIRTUAL VISIT (OUTPATIENT)
Dept: PHYSICAL THERAPY | Facility: CLINIC | Age: 25
End: 2020-06-17
Payer: COMMERCIAL

## 2020-06-17 DIAGNOSIS — M25.551 PAIN OF BOTH HIP JOINTS: ICD-10-CM

## 2020-06-17 DIAGNOSIS — M25.511 SHOULDER PAIN, BILATERAL: ICD-10-CM

## 2020-06-17 DIAGNOSIS — M25.552 PAIN OF BOTH HIP JOINTS: ICD-10-CM

## 2020-06-17 DIAGNOSIS — M25.512 SHOULDER PAIN, BILATERAL: ICD-10-CM

## 2020-06-17 PROCEDURE — 97110 THERAPEUTIC EXERCISES: CPT | Mod: 95 | Performed by: PHYSICAL THERAPIST

## 2020-06-17 NOTE — PROGRESS NOTES
"Physical Therapy Virtual Follow Up Visit      The patient has been notified of following:     \"This virtual visit will be conducted between you and your provider. We have found that certain health care needs can be provided without the need for physical presence.  This service lets us provide the care you need with a virtual visit.\"    Due to external, as well as internal [facility] management of the COVID-19 Virus, Toshia Claros was not seen in our clinic.  As a substitution, we implemented a virtual visit to manage this patient's condition utilizing the P[employer]x virtual visit platform via the patient s existing code.  The provider, Margoth Bar, reviewed the patient's chart, PTRx prescription, and spoke with the patient to determine the following telemedicine visit is appropriate and effective for the patient's care.    The following type of visit was completed:   Video Visit:  The [employer] platform uses a synchronous HIPAA compliant video stream for this patient encounter.        S: Toshia Claros is a 25 year old female. Connected virtually on the PTRx platform to discuss their condition/progress. They noted improvements in muscle control.  They noted ongoing pain or limitations with her new job. She notices she tends to lock her joints when staying in one position for an extended period of time. She Is open to trying kinesiotape to assist at her knees and elbows for better awareness..     Current pain level: 2/10      O: Patient demonstrated moderate scapular winging with quadruped exercises. Able to control during cat/cow but needs to do rockforward and back at the counter to avoid winging.     PTRx Content from today's visit:  Exercise Name: Prone Scapula T, Sets: 2 - Reps: 10 (working up to 20) - Sessions: 1, Notes: you did 16 on 6/17  Exercise Name: Bilateral Rotation With Theraband, Sets: 2 set  - Reps: 10 reps  - Sessions: 1x day, Notes: or sitting in a chair   Exercise Name: All 4s Cat Cow, Sets: " 2 - Reps: 10 , Notes: keep a slight bend in your elbow  You can do this on the at the counter for better control.  Exercise Name: Reverse Pendulum Supine or Sidelying, Sets: 2 - Reps: 6 in each patter - up/down, in and out, circles in each direction, Notes: punch slightly toward the ceiling  Exercise Name: Standing Fire Hydrant, Sets: 2 - Reps: 6 of each pattern, Notes: pillow on wall - make the same patterns as in the shoulder circles  Slight bend in your standing knee  Exercise Name: Abdominal Brace Transverse Abdominis, Sets: 1 - Reps: 5 of 5 second holds - Sessions: 1 x day, Notes: Focus on maintaining neutral pelvis and spine while activating your abdominals. Coordinate your breathing pattern  Exercise Name: Supine Abdominal Exercise #1 (Arm Extension), Sets: 2 - Reps: 10 reps - Sessions: 1, Notes: Add alternating knee kicks on good days  Exercise Name: Bridging #1, Sets: 2 - Reps: 15 reps, 5 sec holds  - Sessions: 1x day , Notes: press lightly in your hands to protect your neck  Exercise Name: All 4s Rock Forward and Backward, Sets: 2 - Reps: 10, Notes: 1) press into your thumb and pointer finger to unlock your elbows  2) belly button to spine  3) shift forward and backward 2 inches  You can do this on the at the counter for better control.  Exercise Name: Knee Bends, Sets: 2 - Reps: 20 reps - Sessions: 1x/day or every other day, Notes: Holding onto the sink or chair    A:   Patient is progressing as expected.  Response to therapy has shown an improvement in  muscle control      P: Patient will continue with the exercise program assigned on their PTRx code and will add the following measures to manage their pain/condition: cat/cow     Current treatment program is being advanced to more complex exercises.      Virtual visit contact time    Time of service began: 4:24 PM  Time of service ended: 5:05 PM  Total Time for set up, visit, and documentation: 43 minutes    Payor: MEDICA / Plan: MEDICA CHOICE / Product  Type: Indemnity /   .  Procedure Code/s   Therapeutic Exercise (08597): 41 minutes    I have reviewed the note as documented above.  This accurately captures the substance of my conversation with the patient.  Provider location: [place], [place] ([facility]/State)  Patient location: [place], [place]

## 2020-07-02 ENCOUNTER — TRANSFERRED RECORDS (OUTPATIENT)
Dept: HEALTH INFORMATION MANAGEMENT | Facility: CLINIC | Age: 25
End: 2020-07-02

## 2020-07-02 ENCOUNTER — THERAPY VISIT (OUTPATIENT)
Dept: PHYSICAL THERAPY | Facility: CLINIC | Age: 25
End: 2020-07-02
Payer: COMMERCIAL

## 2020-07-02 DIAGNOSIS — M25.552 PAIN OF BOTH HIP JOINTS: ICD-10-CM

## 2020-07-02 DIAGNOSIS — M25.511 SHOULDER PAIN, BILATERAL: ICD-10-CM

## 2020-07-02 DIAGNOSIS — M25.551 PAIN OF BOTH HIP JOINTS: ICD-10-CM

## 2020-07-02 DIAGNOSIS — M25.512 SHOULDER PAIN, BILATERAL: ICD-10-CM

## 2020-07-02 PROCEDURE — 97112 NEUROMUSCULAR REEDUCATION: CPT | Mod: GP | Performed by: PHYSICAL THERAPIST

## 2020-07-02 PROCEDURE — 97110 THERAPEUTIC EXERCISES: CPT | Mod: GP | Performed by: PHYSICAL THERAPIST

## 2020-07-07 ENCOUNTER — HOSPITAL ENCOUNTER (OUTPATIENT)
Dept: NUCLEAR MEDICINE | Facility: CLINIC | Age: 25
Setting detail: NUCLEAR MEDICINE
Discharge: HOME | End: 2020-07-07
Attending: INTERNAL MEDICINE | Admitting: INTERNAL MEDICINE
Payer: COMMERCIAL

## 2020-07-07 ENCOUNTER — THERAPY VISIT (OUTPATIENT)
Dept: PHYSICAL THERAPY | Facility: CLINIC | Age: 25
End: 2020-07-07
Payer: COMMERCIAL

## 2020-07-07 DIAGNOSIS — M25.512 SHOULDER PAIN, BILATERAL: ICD-10-CM

## 2020-07-07 DIAGNOSIS — M25.551 PAIN OF BOTH HIP JOINTS: ICD-10-CM

## 2020-07-07 DIAGNOSIS — Z86.59 HISTORY OF EATING DISORDER: ICD-10-CM

## 2020-07-07 DIAGNOSIS — R63.4 WEIGHT LOSS: ICD-10-CM

## 2020-07-07 DIAGNOSIS — E73.9 LACTOSE INTOLERANCE: ICD-10-CM

## 2020-07-07 DIAGNOSIS — Q79.61 EHLERS-DANLOS SYNDROME, CLASSIC TYPE: ICD-10-CM

## 2020-07-07 DIAGNOSIS — M25.511 SHOULDER PAIN, BILATERAL: ICD-10-CM

## 2020-07-07 DIAGNOSIS — K59.00 CONSTIPATION, UNSPECIFIED CONSTIPATION TYPE: ICD-10-CM

## 2020-07-07 DIAGNOSIS — R68.81 EARLY SATIETY: ICD-10-CM

## 2020-07-07 DIAGNOSIS — M25.552 PAIN OF BOTH HIP JOINTS: ICD-10-CM

## 2020-07-07 PROCEDURE — 97110 THERAPEUTIC EXERCISES: CPT | Mod: GP | Performed by: PHYSICAL THERAPIST

## 2020-07-07 PROCEDURE — A9541 TC99M SULFUR COLLOID: HCPCS | Performed by: INTERNAL MEDICINE

## 2020-07-07 PROCEDURE — 97112 NEUROMUSCULAR REEDUCATION: CPT | Mod: GP | Performed by: PHYSICAL THERAPIST

## 2020-07-07 PROCEDURE — 78264 GASTRIC EMPTYING IMG STUDY: CPT

## 2020-07-07 PROCEDURE — 34300033 ZZH RX 343: Performed by: INTERNAL MEDICINE

## 2020-07-07 RX ADMIN — Medication 2 MILLICURIE: at 10:00

## 2020-07-08 ENCOUNTER — TRANSFERRED RECORDS (OUTPATIENT)
Dept: HEALTH INFORMATION MANAGEMENT | Facility: CLINIC | Age: 25
End: 2020-07-08

## 2020-07-13 PROBLEM — Q79.60 EHLERS-DANLOS SYNDROME: Status: ACTIVE | Noted: 2020-07-13

## 2020-07-22 ENCOUNTER — THERAPY VISIT (OUTPATIENT)
Dept: PHYSICAL THERAPY | Facility: CLINIC | Age: 25
End: 2020-07-22
Payer: COMMERCIAL

## 2020-07-22 DIAGNOSIS — M25.551 PAIN OF BOTH HIP JOINTS: ICD-10-CM

## 2020-07-22 DIAGNOSIS — M25.552 PAIN OF BOTH HIP JOINTS: ICD-10-CM

## 2020-07-22 DIAGNOSIS — M25.511 SHOULDER PAIN, BILATERAL: ICD-10-CM

## 2020-07-22 DIAGNOSIS — M25.512 SHOULDER PAIN, BILATERAL: ICD-10-CM

## 2020-07-22 PROCEDURE — 97110 THERAPEUTIC EXERCISES: CPT | Mod: GP | Performed by: PHYSICAL THERAPIST

## 2020-07-22 PROCEDURE — 97112 NEUROMUSCULAR REEDUCATION: CPT | Mod: GP | Performed by: PHYSICAL THERAPIST

## 2020-08-06 ENCOUNTER — THERAPY VISIT (OUTPATIENT)
Dept: PHYSICAL THERAPY | Facility: CLINIC | Age: 25
End: 2020-08-06
Payer: COMMERCIAL

## 2020-08-06 DIAGNOSIS — M25.551 PAIN OF BOTH HIP JOINTS: ICD-10-CM

## 2020-08-06 DIAGNOSIS — M25.512 SHOULDER PAIN, BILATERAL: ICD-10-CM

## 2020-08-06 DIAGNOSIS — M25.511 SHOULDER PAIN, BILATERAL: ICD-10-CM

## 2020-08-06 DIAGNOSIS — M25.552 PAIN OF BOTH HIP JOINTS: ICD-10-CM

## 2020-08-06 PROCEDURE — 97110 THERAPEUTIC EXERCISES: CPT | Mod: GP | Performed by: PHYSICAL THERAPIST

## 2020-08-06 PROCEDURE — 97112 NEUROMUSCULAR REEDUCATION: CPT | Mod: GP | Performed by: PHYSICAL THERAPIST

## 2020-08-28 ENCOUNTER — THERAPY VISIT (OUTPATIENT)
Dept: PHYSICAL THERAPY | Facility: CLINIC | Age: 25
End: 2020-08-28
Payer: COMMERCIAL

## 2020-08-28 DIAGNOSIS — M25.512 SHOULDER PAIN, BILATERAL: ICD-10-CM

## 2020-08-28 DIAGNOSIS — M25.552 PAIN OF BOTH HIP JOINTS: ICD-10-CM

## 2020-08-28 DIAGNOSIS — M25.511 SHOULDER PAIN, BILATERAL: ICD-10-CM

## 2020-08-28 DIAGNOSIS — M25.551 PAIN OF BOTH HIP JOINTS: ICD-10-CM

## 2020-08-28 PROCEDURE — 97110 THERAPEUTIC EXERCISES: CPT | Mod: GP | Performed by: PHYSICAL THERAPIST

## 2020-08-28 PROCEDURE — 97112 NEUROMUSCULAR REEDUCATION: CPT | Mod: GP | Performed by: PHYSICAL THERAPIST

## 2020-09-24 ENCOUNTER — THERAPY VISIT (OUTPATIENT)
Dept: PHYSICAL THERAPY | Facility: CLINIC | Age: 25
End: 2020-09-24
Payer: COMMERCIAL

## 2020-09-24 DIAGNOSIS — M25.552 PAIN OF BOTH HIP JOINTS: ICD-10-CM

## 2020-09-24 DIAGNOSIS — M25.511 SHOULDER PAIN, BILATERAL: ICD-10-CM

## 2020-09-24 DIAGNOSIS — M25.512 SHOULDER PAIN, BILATERAL: ICD-10-CM

## 2020-09-24 DIAGNOSIS — M25.551 PAIN OF BOTH HIP JOINTS: ICD-10-CM

## 2020-09-24 PROCEDURE — 97530 THERAPEUTIC ACTIVITIES: CPT | Mod: GP | Performed by: PHYSICAL THERAPIST

## 2020-09-24 PROCEDURE — 97112 NEUROMUSCULAR REEDUCATION: CPT | Mod: GP | Performed by: PHYSICAL THERAPIST

## 2020-09-24 PROCEDURE — 97110 THERAPEUTIC EXERCISES: CPT | Mod: GP | Performed by: PHYSICAL THERAPIST

## 2020-10-22 ENCOUNTER — THERAPY VISIT (OUTPATIENT)
Dept: PHYSICAL THERAPY | Facility: CLINIC | Age: 25
End: 2020-10-22
Payer: COMMERCIAL

## 2020-10-22 DIAGNOSIS — M25.512 SHOULDER PAIN, BILATERAL: ICD-10-CM

## 2020-10-22 DIAGNOSIS — M25.511 SHOULDER PAIN, BILATERAL: ICD-10-CM

## 2020-10-22 DIAGNOSIS — M25.552 PAIN OF BOTH HIP JOINTS: ICD-10-CM

## 2020-10-22 DIAGNOSIS — M25.551 PAIN OF BOTH HIP JOINTS: ICD-10-CM

## 2020-10-22 PROCEDURE — 97110 THERAPEUTIC EXERCISES: CPT | Mod: GP | Performed by: PHYSICAL THERAPIST

## 2020-10-22 PROCEDURE — 97112 NEUROMUSCULAR REEDUCATION: CPT | Mod: GP | Performed by: PHYSICAL THERAPIST

## 2020-11-02 ENCOUNTER — TRANSFERRED RECORDS (OUTPATIENT)
Dept: HEALTH INFORMATION MANAGEMENT | Facility: CLINIC | Age: 25
End: 2020-11-02

## 2020-11-05 NOTE — TELEPHONE ENCOUNTER
DIAGNOSIS: Ref by PT Margoth Bar for Hypermobility syndrome   APPOINTMENT DATE: 12.4.20   NOTES STATUS DETAILS   OFFICE NOTE from referring provider Internal Physical therapy  Yos[place]any  10.22.20  9.24.20  8.28.20  8.6.20  7.22.20  More in EPIC   OFFICE NOTE from other specialist Internal 1.17.20 [facility]   DISCHARGE SUMMARY from hospital N/A    DISCHARGE REPORT from the ER N/A    OPERATIVE REPORT N/A    MEDICATION LIST Internal    MRI N/A    CT SCAN N/A    XRAYS (IMAGES & REPORTS) N/A

## 2020-11-14 ENCOUNTER — HEALTH MAINTENANCE LETTER (OUTPATIENT)
Age: 25
End: 2020-11-14

## 2020-11-16 ENCOUNTER — THERAPY VISIT (OUTPATIENT)
Dept: PHYSICAL THERAPY | Facility: CLINIC | Age: 25
End: 2020-11-16
Payer: COMMERCIAL

## 2020-11-16 DIAGNOSIS — M25.552 PAIN OF BOTH HIP JOINTS: ICD-10-CM

## 2020-11-16 DIAGNOSIS — M25.512 SHOULDER PAIN, BILATERAL: ICD-10-CM

## 2020-11-16 DIAGNOSIS — M25.551 PAIN OF BOTH HIP JOINTS: ICD-10-CM

## 2020-11-16 DIAGNOSIS — M25.511 SHOULDER PAIN, BILATERAL: ICD-10-CM

## 2020-11-16 PROCEDURE — 97110 THERAPEUTIC EXERCISES: CPT | Mod: GP | Performed by: PHYSICAL THERAPIST

## 2020-11-16 PROCEDURE — 97112 NEUROMUSCULAR REEDUCATION: CPT | Mod: GP | Performed by: PHYSICAL THERAPIST

## 2020-11-16 NOTE — PROGRESS NOTES
PROGRESS  REPORT    Progress reporting period is from 6/17/20 to 11/16/20.       SUBJECTIVE  Subjective changes noted by patient:  Toshia has attended 11 visits of Physical Therapy. She has been diligent with her exercises and notes improvements in body awareness but continues to experience instability episodes at bilateral shoulders, left hip, left wrist, and left knee frequently throughout the day. She reports the pain is constantly 4/10 with spikes of pain following instability episodes.       Current pain level is 4/10  .     Previous pain level was: 4/10.   Changes in function:  Yes (See Goal flowsheet attached for changes in current functional level)  Adverse reaction to treatment or activity: None    OBJECTIVE  Changes noted in objective findings:  Yes,   Beighton scale: 9/9    HIP:  Strength:   L R   HIP     Flex 3+/5 3+/5   Ext 3+/5 3+/5   ABD 4/5 4/5 with pain   ER 4+/5 4+/5   KNEE     Flex 4+/5 4+/5   Ext 4-/5 4-/5     Shoulder:   MMT L MMT R   Flex 4-/5 4/5   Abd 4-/5 4-/5   Extension 4-/5 4-/5   Adduction 4-/5 4-/5   IR 4+/5 4+/5   ER 4-/5 4-/5     Scapulothoraic Rhythm: moderate medial border prominence R>L, severe when fatigued    Special tests:   L R   Instability     Apprehension (anterior) + +   Relocation (anterior) + +   Anterior Load & Shift + +   Posterior Load & Shift + +   Multi-Directional Instability      Sulcus + +             ASSESSMENT/PLAN  Updated problem list and treatment plan: Diagnosis 1:   Hypermobility Syndrome  :[id number]}  STG/LTGs have been met or progress has been made towards goals:  Yes (See Goal flow sheet completed today.)  Assessment of Progress: The patient's condition is improving.  The patient's progress has slowed.  Self Management Plans:  Patient has been instructed in a home treatment program.  I have re-evaluated this patient and find that the nature, scope, duration and intensity of the therapy is appropriate for the medical condition of the patient.  Toshia continues  to require the following intervention to meet STG and LTG's:  PT    Recommendations:  This patient would benefit from further evaluation for additional assistance in pain control and management of hypermobility. She would benefit from bracing and further MD assessment.     Please refer to the daily flowsheet for treatment today, total treatment time and time spent performing 1:1 timed codes.

## 2020-12-04 ENCOUNTER — OFFICE VISIT (OUTPATIENT)
Dept: ORTHOPEDICS | Facility: CLINIC | Age: 25
End: 2020-12-04
Payer: COMMERCIAL

## 2020-12-04 ENCOUNTER — ANCILLARY PROCEDURE (OUTPATIENT)
Dept: GENERAL RADIOLOGY | Facility: CLINIC | Age: 25
End: 2020-12-04
Attending: STUDENT IN AN ORGANIZED HEALTH CARE EDUCATION/TRAINING PROGRAM
Payer: COMMERCIAL

## 2020-12-04 ENCOUNTER — PRE VISIT (OUTPATIENT)
Dept: ORTHOPEDICS | Facility: CLINIC | Age: 25
End: 2020-12-04

## 2020-12-04 VITALS — BODY MASS INDEX: 20.23 KG/M2 | HEIGHT: 63 IN | WEIGHT: 114.2 LBS

## 2020-12-04 DIAGNOSIS — M25.312 INSTABILITY OF BOTH SHOULDER JOINTS: ICD-10-CM

## 2020-12-04 DIAGNOSIS — M25.312 INSTABILITY OF BOTH SHOULDER JOINTS: Primary | ICD-10-CM

## 2020-12-04 DIAGNOSIS — M25.311 INSTABILITY OF BOTH SHOULDER JOINTS: Primary | ICD-10-CM

## 2020-12-04 DIAGNOSIS — M25.311 INSTABILITY OF BOTH SHOULDER JOINTS: ICD-10-CM

## 2020-12-04 PROCEDURE — 73030 X-RAY EXAM OF SHOULDER: CPT | Mod: GC | Performed by: RADIOLOGY

## 2020-12-04 PROCEDURE — 99204 OFFICE O/P NEW MOD 45 MIN: CPT | Mod: GC | Performed by: STUDENT IN AN ORGANIZED HEALTH CARE EDUCATION/TRAINING PROGRAM

## 2020-12-04 ASSESSMENT — MIFFLIN-ST. JEOR: SCORE: 1237.14

## 2020-12-04 NOTE — PROGRESS NOTES
Subjective:   Toshia Claros is a 25 year old female who here for Hypermobility Syndrome. Has it most of her life and has become more painful lately. Having more dislocations and subluxations. She was referred to us by her PT. She was referred to PT by rheumatologist whom she was seeing specifically for this. She has always noticed, since a child, that she has had low-grade mild pain and her shoulders would dislocate around the age of 6. As she has gotten older, more joints are popping out including her hips, wrist, and knees. She has had one fracture of her right ankle in the past, 2017, and she cannot remember how (possibly from simple running). Other associated concerns include a sluggish GI system. She also noices double vision with looking left. No rashes, blood in stool, weight loss, hair loss, chest pain, palpitations, or syncope. She is in PT and has been since May/Gely at [facility] ([facility]), working with [employer].     Her main concern today is her multiple times per day shoulder dislocations bilaterally.  She was referred from physical therapy for this reason.  Patient also states that she will sometimes wake up in the morning with her shoulder dislocated.  Pain subsequent to the dislocation.    Background:   Date of injury: NA    Duration of symptoms: 10 years  Mechanism of Injury: Chronic; Daily Activities   Intensity: 6/10 at rest, 10/10 with activity   Aggravating factors: Daily activities, running causes more pain,   Relieving Factors: rest  Prior Evaluation: Physical Therapy and Rheumatology     PAST MEDICAL, SOCIAL, SURGICAL AND FAMILY HISTORY: Past medical, surgical, family and social history was reviewed and unchanged since last visit.  ALLERGIES: She has No Known Allergies.    CURRENT MEDICATIONS: She has a current medication list which includes the following prescription(s): amphetamine-dextroamphetamine and prazosin.     REVIEW OF SYSTEMS: As above in HPI     Exam:   There were no  vitals taken for this visit.           CONSTITUTIONAL: healthy, alert and no distress  HEAD: Normocephalic. No masses, lesions, tenderness or abnormalities  SKIN: no suspicious lesions or rashes  Cardiovascular: Regular rate and rhythm with no murmurs noted  Respiratory: Lungs CTA.  No evidence of consolidation, wheeze, or rhonchi.  GAIT: normal  NEUROLOGIC: Non-focal  PSYCHIATRIC: affect normal/bright and mentation appears normal.    MUSCULOSKELETAL: Excessive full range of motion passively of the bilateral shoulders.  Patient able to perform full 360 circumduction of shoulders going from flexion to extension today without evidence of subluxation or dislocation.  No tenderness to palpation of the shoulder girdle anteriorly or posteriorly.  Hypermobile thumb/CMC joint patient able to reach the radial aspect of her forearm bilaterally.       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  Has had a full work-up with rheumatology with a recommendation to start physical therapy.  Her overall exam today was reassuring for other systemic findings, however because of the multiple times per day of her bilateral shoulder dislocations further investigation into the shoulder joint is warranted.    -Bilateral in office x-rays ordered for today  -Bilateral MRI of the shoulders has been ordered to evaluate for bony or soft tissue lesions including a Hill-Sachs and Bankart. Patient will call to schedule the imaging.  -Should follow-up after imaging has been obtained to discuss further management.    Patient has been seen and discussed with Dr. Chandrakant Miller DO  Primary Care Sports Medicine Fellow

## 2020-12-04 NOTE — PROGRESS NOTES
Attending Note:   I have personally examined this patient and have reviewed the clinical presentation and progress note with the fellow. I agree with the treatment plan as outlined. The plan was formulated with the fellow on the day of the patient's visit.  Kori tSallings MD, CAQ, CCD  [facility]  Sports Medicine and Bone Health

## 2020-12-04 NOTE — LETTER
12/4/2020      RE: Toshia Claros  [address] [facility] [id number]        Subjective:   Toshia Claros is a 25 year old female who here for Hypermobility Syndrome. Has it most of her life and has become more painful lately. Having more dislocations and subluxations. She was referred to us by her PT. She was referred to PT by rheumatologist whom she was seeing specifically for this. She has always noticed, since a child, that she has had low-grade mild pain and her shoulders would dislocate around the age of 6. As she has gotten older, more joints are popping out including her hips, wrist, and knees. She has had one fracture of her right ankle in the past, 2017, and she cannot remember how (possibly from simple running). Other associated concerns include a sluggish GI system. She also noices double vision with looking left. No rashes, blood in stool, weight loss, hair loss, chest pain, palpitations, or syncope. She is in PT and has been since May/Gely at [facility] working with [employer].     Her main concern today is her multiple times per day shoulder dislocations bilaterally.  She was referred from physical therapy for this reason.  Patient also states that she will sometimes wake up in the morning with her shoulder dislocated.  Pain subsequent to the dislocation.    Background:   Date of injury: NA    Duration of symptoms: 10 years  Mechanism of Injury: Chronic; Daily Activities   Intensity: 6/10 at rest, 10/10 with activity   Aggravating factors: Daily activities, running causes more pain,   Relieving Factors: rest  Prior Evaluation: Physical Therapy and Rheumatology     PAST MEDICAL, SOCIAL, SURGICAL AND FAMILY HISTORY: Past medical, surgical, family and social history was reviewed and unchanged since last visit.  ALLERGIES: She has No Known Allergies.    CURRENT MEDICATIONS: She has a current medication list which includes the following prescription(s):  amphetamine-dextroamphetamine and prazosin.     REVIEW OF SYSTEMS: As above in HPI     Exam:   There were no vitals taken for this visit.           CONSTITUTIONAL: healthy, alert and no distress  HEAD: Normocephalic. No masses, lesions, tenderness or abnormalities  SKIN: no suspicious lesions or rashes  Cardiovascular: Regular rate and rhythm with no murmurs noted  Respiratory: Lungs CTA.  No evidence of consolidation, wheeze, or rhonchi.  GAIT: normal  NEUROLOGIC: Non-focal  PSYCHIATRIC: affect normal/bright and mentation appears normal.    MUSCULOSKELETAL: Excessive full range of motion passively of the bilateral shoulders.  Patient able to perform full 360 circumduction of shoulders going from flexion to extension today without evidence of subluxation or dislocation.  No tenderness to palpation of the shoulder girdle anteriorly or posteriorly.  Hypermobile thumb/CMC joint patient able to reach the radial aspect of her forearm bilaterally.       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  Has had a full work-up with rheumatology with a recommendation to start physical therapy.  Her overall exam today was reassuring for other systemic findings, however because of the multiple times per day of her bilateral shoulder dislocations further investigation into the shoulder joint is warranted.    -Bilateral in office x-rays ordered for today  -Bilateral MRI of the shoulders has been ordered to evaluate for bony or soft tissue lesions including a Hill-Sachs and Bankart. Patient will call to schedule the imaging.  -Should follow-up after imaging has been obtained to discuss further management.    Patient has been seen and discussed with Dr. Chandrakant Miller DO  Primary Care Sports Medicine Fellow      Attending Note:   I have personally examined  this patient and have reviewed the clinical presentation and progress note with the fellow. I agree with the treatment plan as outlined. The plan was formulated with the fellow on the day of the patient's visit.  Kori Stallings MD, CAQ, CCD  [facility]  Sports Medicine and Bone Health      Kori Stallings MD

## 2020-12-10 ENCOUNTER — TRANSFERRED RECORDS (OUTPATIENT)
Dept: HEALTH INFORMATION MANAGEMENT | Facility: CLINIC | Age: 25
End: 2020-12-10

## 2020-12-27 ENCOUNTER — ANCILLARY PROCEDURE (OUTPATIENT)
Dept: MRI IMAGING | Facility: CLINIC | Age: 25
End: 2020-12-27
Attending: STUDENT IN AN ORGANIZED HEALTH CARE EDUCATION/TRAINING PROGRAM
Payer: COMMERCIAL

## 2020-12-27 DIAGNOSIS — M25.312 INSTABILITY OF BOTH SHOULDER JOINTS: ICD-10-CM

## 2020-12-27 DIAGNOSIS — M25.311 INSTABILITY OF BOTH SHOULDER JOINTS: ICD-10-CM

## 2020-12-27 PROCEDURE — 73221 MRI JOINT UPR EXTREM W/O DYE: CPT | Mod: LT | Performed by: RADIOLOGY

## 2020-12-27 PROCEDURE — 73221 MRI JOINT UPR EXTREM W/O DYE: CPT | Mod: RT | Performed by: RADIOLOGY

## 2020-12-31 ENCOUNTER — OFFICE VISIT (OUTPATIENT)
Dept: ORTHOPEDICS | Facility: CLINIC | Age: 25
End: 2020-12-31
Payer: COMMERCIAL

## 2020-12-31 DIAGNOSIS — S43.003D SUBLUXATION OF SHOULDER JOINT, UNSPECIFIED LATERALITY, SUBSEQUENT ENCOUNTER: Primary | ICD-10-CM

## 2020-12-31 PROCEDURE — 99214 OFFICE O/P EST MOD 30 MIN: CPT | Mod: GC | Performed by: STUDENT IN AN ORGANIZED HEALTH CARE EDUCATION/TRAINING PROGRAM

## 2020-12-31 RX ORDER — DICLOFENAC SODIUM 75 MG/1
75 TABLET, DELAYED RELEASE ORAL 2 TIMES DAILY
Qty: 28 TABLET | Refills: 1 | Status: SHIPPED | OUTPATIENT
Start: 2020-12-31 | End: 2021-09-10

## 2020-12-31 NOTE — PROGRESS NOTES
Subjective:   Toshia Claros is a 25 year old female who here for Hypermobility Syndrome. Has it most of her life and has become more painful lately. Having more dislocations and subluxations. She was referred to us by her PT. She was referred to PT by rheumatologist whom she was seeing specifically for this. She has always noticed, since a child, that she has had low-grade mild pain and her shoulders would dislocate around the age of 6. As she has gotten older, more joints are popping out including her hips, wrist, and knees. She has had one fracture of her right ankle in the past, 2017, and she cannot remember how (possibly from simple running). Other associated concerns include a sluggish GI system. She also noices double vision with looking left. No rashes, blood in stool, weight loss, hair loss, chest pain, palpitations, or syncope. She is in PT and has been since May/Gely at [facility] ([facility] working with [employer].      Her main concern today is her multiple times per day shoulder dislocations bilaterally.  She was referred from physical therapy for this reason.  Patient also states that she will sometimes wake up in the morning with her shoulder dislocated.  Pain subsequent to the dislocation.     Interval history: Toshia remains with daily subluxations of her bilateral shoulders.  She also states that she is able to manually dislocate each shoulder at will.  Shoulder dislocations are also happening while she is walking or performing any activities of daily living.  Slight pain with the dislocations.  Not controlled by ibuprofen.     Background:   Date of injury: NA    Duration of symptoms: 10 years  Mechanism of Injury: Chronic; Daily Activities   Intensity: 6/10 at rest, 10/10 with activity   Aggravating factors: Daily activities, running causes more pain,   Relieving Factors: rest  Prior Evaluation: Physical Therapy and Rheumatology      PAST MEDICAL, SOCIAL, SURGICAL AND FAMILY HISTORY: Past  medical, surgical, family and social history was reviewed and unchanged since last visit.  ALLERGIES: She has No Known Allergies.     CURRENT MEDICATIONS: She has a current medication list which includes the following prescription(s): amphetamine-dextroamphetamine and prazosin.      REVIEW OF SYSTEMS: As above in HPI      Exam:   There were no vitals taken for this visit.           CONSTITUTIONAL: healthy, alert and no distress  HEAD: Normocephalic. No masses, lesions, tenderness or abnormalities  SKIN: no suspicious lesions or rashes  Cardiovascular: Regular rate and rhythm with no murmurs noted  Respiratory: Lungs CTA.  No evidence of consolidation, wheeze, or rhonchi.  GAIT: normal  NEUROLOGIC: Non-focal  PSYCHIATRIC: affect normal/bright and mentation appears normal.     MUSCULOSKELETAL: Excessive full range of motion passively of the bilateral shoulders.  Patient able to perform full 360 circumduction of shoulders going from flexion to extension today without evidence of subluxation or dislocation.  No tenderness to palpation of the shoulder girdle anteriorly or posteriorly.  Hypermobile thumb/CMC joint patient able to reach the radial aspect of her forearm bilaterally.    Study Result    4 views bilateral shoulder radiographs 12/4/2020 10:14 AM     History: Instability of both shoulder joints; Instability of both  shoulder joints     Additional History from EMR: History of Santa Danlos recently having  more dislocations and subluxations.     Comparison: None     Findings:     AP, Grashey, transscapular Y, axillary  views of the bilateral  shoulder were obtained.      Right     No acute osseous abnormality.      Glenohumeral and acromioclavicular joints are congruent. The humeral  head is located slightly more inferior  than normally expected, with a distance of 16 mm between the humeral  head and acromion, compared to a distance of 11 mm between the left  humeral head and acromion.     No substantial  degenerative changes of the acromioclavicular or  glenohumeral joint.     Soft tissue is unremarkable. The visualized lung is clear.     Left     No acute osseous abnormality. Glenohumeral and acromioclavicular  joints are congruent.     No substantial degenerative changes of the acromioclavicular or  glenohumeral joint.     Soft tissue is unremarkable. The visualized lung is clear.                                                                      Impression:  1. No acute osseous abnormality.  2. Slight inferior displacement of the right humeral head in the  glenohumeral joint, likely related to patient's known diagnosis of  Santa-Danlos syndrome i.e. joint laxity vs. presence of joint  effusion.  3. No substantial degenerative change in either shoulder.         Study Result    EXAM: MR Right shoulder without  contrast 12/27/2020 8:45 AM     TECHNIQUE: Multiplanar, multisequence imaging of the right shoulder  were obtained without administration of intravenous or intra-articular  gadolinium contrast using routine protocol.     History: Dislocation, shoulder; Instability of both shoulder joints;  Instability of both shoulder joints      Comparison: Radiographs 12/4/2020     Findings:     ROTATOR CUFF and ASSOCIATED STRUCTURES  Rotator cuff: Supraspinatus, infraspinatus, teres minor and  subscapularis tendons are intact.      Bursa: No subacromial or subdeltoid bursal fluid.     Musculature: Muscle bulk of rotator cuff is preserved.  Deltoid muscle  bulk is also preserved.  No muscle edema.     Acromioclavicular joint  There are mild degenerative changes of the acromioclavicular joint.  Acromion is type 2 in sagittal morphology.  Coracoacromial ligament is  not thickened.     OSSEOUS STRUCTURES  No fracture, marrow contusion or marrow infiltration.     LONG BICIPITAL TENDON  The long head of the biceps tendon is normally situated within the  bicipital groove. No complete or partial biceps tendon tear  is  present.     GLENOHUMERAL JOINT  Joint fluid: Physiologic amount of joint fluid is  present.     Cartilage and subarticular bone:  No focal hyaline cartilage defects  are noted. No Hill-Sachs, reverse Hill-Sachs, or bony Bankart lesions  are seen.     Labrum: Limited assessment on this study with relative lack of joint  distention shows no labral tear.     ANCILLARY FINDINGS:  None                                                                      Impression:     Normal MRI of the right shoulder. Intact rotator cuff. No MR evidence  of recent shoulder dislocation.            Study Result    EXAM: MR left shoulder without  contrast 12/27/2020 9:02 AM     TECHNIQUE: Multiplanar, multisequence imaging of the left shoulder  were obtained without administration of intravenous or intra-articular  gadolinium contrast using routine protocol.     History: Dislocation, shoulder; Instability of both shoulder joints;  Instability of both shoulder joints      Comparison: Radiographs 12/4/2020     Findings:     ROTATOR CUFF and ASSOCIATED STRUCTURES  Rotator cuff: Supraspinatus, infraspinatus, teres minor and  subscapularis tendons are intact.      Bursa: No subacromial or subdeltoid bursal fluid.     Musculature: Muscle bulk of rotator cuff is preserved.  Deltoid muscle  bulk is also preserved.  No muscle edema.     Acromioclavicular joint  There are mild degenerative changes of the acromioclavicular joint.  Acromion is type 2 in sagittal morphology.  Coracoacromial ligament is  not thickened.     OSSEOUS STRUCTURES  No fracture, marrow contusion or marrow infiltration.     LONG BICIPITAL TENDON  The long head of the biceps tendon is normally situated within the  bicipital groove. No complete or partial biceps tendon tear is  present.     GLENOHUMERAL JOINT  Joint fluid: Physiologic amount of joint fluid is  present.     Cartilage and subarticular bone:  No focal hyaline cartilage defects  are noted. No Hill-Sachs, reverse  Hill-Sachs, or bony Bankart lesions  are seen.     Labrum: Limited assessment on this study with relative lack of joint  distention shows no labral tear.     ANCILLARY FINDINGS:  None                                                                      Impression:     Normal MRI of the left shoulder. No MR evidence of recent shoulder  dislocation.              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  Has had a full work-up with rheumatology with a recommendation to start physical therapy.  Her overall exam today was reassuring for other systemic findings, however she remains with multiple subluxations throughout the week causing her significant distress and functionality declining.  MRIs have been discussed today at length.     -Orthopedic surgeon referral order placed in system.  Patient has tried multiple months of physical therapy as well as kinesiotaping and patricio bracing without any improvement.  She would like to discuss possible surgical options if warranted.  -Ibuprofen is not helpful for the pain associated with subluxations.  Diclofenac 75 mg to be used twice daily with food was sent to her pharmacy.     Patient has been seen and discussed with Dr. Chandrakant Miller DO  Primary Care Sports Medicine Fellow

## 2020-12-31 NOTE — LETTER
12/31/2020      RE: Toshia Claros  [address] [facility] [address]        Subjective:   Toshia Claros is a 25 year old female who here for Hypermobility Syndrome. Has it most of her life and has become more painful lately. Having more dislocations and subluxations. She was referred to us by her PT. She was referred to PT by rheumatologist whom she was seeing specifically for this. She has always noticed, since a child, that she has had low-grade mild pain and her shoulders would dislocate around the age of 6. As she has gotten older, more joints are popping out including her hips, wrist, and knees. She has had one fracture of her right ankle in the past, 2017, and she cannot remember how (possibly from simple running). Other associated concerns include a sluggish GI system. She also noices double vision with looking left. No rashes, blood in stool, weight loss, hair loss, chest pain, palpitations, or syncope. She is in PT and has been since May/G[place] at [employer] ([facility]), working with [employer].      Her main concern today is her multiple times per day shoulder dislocations bilaterally.  She was referred from physical therapy for this reason.  Patient also states that she will sometimes wake up in the morning with her shoulder dislocated.  Pain subsequent to the dislocation.     Interval history: Toshia remains with daily subluxations of her bilateral shoulders.  She also states that she is able to manually dislocate each shoulder at will.  Shoulder dislocations are also happening while she is walking or performing any activities of daily living.  Slight pain with the dislocations.  Not controlled by ibuprofen.     Background:   Date of injury: NA    Duration of symptoms: 10 years  Mechanism of Injury: Chronic; Daily Activities   Intensity: 6/10 at rest, 10/10 with activity   Aggravating factors: Daily activities, running causes more pain,   Relieving Factors: rest  Prior Evaluation:  Physical Therapy and Rheumatology      PAST MEDICAL, SOCIAL, SURGICAL AND FAMILY HISTORY: Past medical, surgical, family and social history was reviewed and unchanged since last visit.  ALLERGIES: She has No Known Allergies.     CURRENT MEDICATIONS: She has a current medication list which includes the following prescription(s): amphetamine-dextroamphetamine and prazosin.      REVIEW OF SYSTEMS: As above in HPI      Exam:   There were no vitals taken for this visit.           CONSTITUTIONAL: healthy, alert and no distress  HEAD: Normocephalic. No masses, lesions, tenderness or abnormalities  SKIN: no suspicious lesions or rashes  Cardiovascular: Regular rate and rhythm with no murmurs noted  Respiratory: Lungs CTA.  No evidence of consolidation, wheeze, or rhonchi.  GAIT: normal  NEUROLOGIC: Non-focal  PSYCHIATRIC: affect normal/bright and mentation appears normal.     MUSCULOSKELETAL: Excessive full range of motion passively of the bilateral shoulders.  Patient able to perform full 360 circumduction of shoulders going from flexion to extension today without evidence of subluxation or dislocation.  No tenderness to palpation of the shoulder girdle anteriorly or posteriorly.  Hypermobile thumb/CMC joint patient able to reach the radial aspect of her forearm bilaterally.    Study Result    4 views bilateral shoulder radiographs 12/4/2020 10:14 AM     History: Instability of both shoulder joints; Instability of both  shoulder joints     Additional History from EMR: History of Santa Danlos recently having  more dislocations and subluxations.     Comparison: None     Findings:     AP, Grashey, transscapular Y, axillary  views of the bilateral  shoulder were obtained.      Right     No acute osseous abnormality.      Glenohumeral and acromioclavicular joints are congruent. The humeral  head is located slightly more inferior  than normally expected, with a distance of 16 mm between the humeral  head and acromion, compared  to a distance of 11 mm between the left  humeral head and acromion.     No substantial degenerative changes of the acromioclavicular or  glenohumeral joint.     Soft tissue is unremarkable. The visualized lung is clear.     Left     No acute osseous abnormality. Glenohumeral and acromioclavicular  joints are congruent.     No substantial degenerative changes of the acromioclavicular or  glenohumeral joint.     Soft tissue is unremarkable. The visualized lung is clear.                                                                      Impression:  1. No acute osseous abnormality.  2. Slight inferior displacement of the right humeral head in the  glenohumeral joint, likely related to patient's known diagnosis of  Santa-Danlos syndrome i.e. joint laxity vs. presence of joint  effusion.  3. No substantial degenerative change in either shoulder.         Study Result    EXAM: MR Right shoulder without  contrast 12/27/2020 8:45 AM     TECHNIQUE: Multiplanar, multisequence imaging of the right shoulder  were obtained without administration of intravenous or intra-articular  gadolinium contrast using routine protocol.     History: Dislocation, shoulder; Instability of both shoulder joints;  Instability of both shoulder joints      Comparison: Radiographs 12/4/2020     Findings:     ROTATOR CUFF and ASSOCIATED STRUCTURES  Rotator cuff: Supraspinatus, infraspinatus, teres minor and  subscapularis tendons are intact.      Bursa: No subacromial or subdeltoid bursal fluid.     Musculature: Muscle bulk of rotator cuff is preserved.  Deltoid muscle  bulk is also preserved.  No muscle edema.     Acromioclavicular joint  There are mild degenerative changes of the acromioclavicular joint.  Acromion is type 2 in sagittal morphology.  Coracoacromial ligament is  not thickened.     OSSEOUS STRUCTURES  No fracture, marrow contusion or marrow infiltration.     LONG BICIPITAL TENDON  The long head of the biceps tendon is normally  situated within the  bicipital groove. No complete or partial biceps tendon tear is  present.     GLENOHUMERAL JOINT  Joint fluid: Physiologic amount of joint fluid is  present.     Cartilage and subarticular bone:  No focal hyaline cartilage defects  are noted. No Hill-Sachs, reverse Hill-Sachs, or bony Bankart lesions  are seen.     Labrum: Limited assessment on this study with relative lack of joint  distention shows no labral tear.     ANCILLARY FINDINGS:  None                                                                      Impression:     Normal MRI of the right shoulder. Intact rotator cuff. No MR evidence  of recent shoulder dislocation.            Study Result    EXAM: MR left shoulder without  contrast 12/27/2020 9:02 AM     TECHNIQUE: Multiplanar, multisequence imaging of the left shoulder  were obtained without administration of intravenous or intra-articular  gadolinium contrast using routine protocol.     History: Dislocation, shoulder; Instability of both shoulder joints;  Instability of both shoulder joints      Comparison: Radiographs 12/4/2020     Findings:     ROTATOR CUFF and ASSOCIATED STRUCTURES  Rotator cuff: Supraspinatus, infraspinatus, teres minor and  subscapularis tendons are intact.      Bursa: No subacromial or subdeltoid bursal fluid.     Musculature: Muscle bulk of rotator cuff is preserved.  Deltoid muscle  bulk is also preserved.  No muscle edema.     Acromioclavicular joint  There are mild degenerative changes of the acromioclavicular joint.  Acromion is type 2 in sagittal morphology.  Coracoacromial ligament is  not thickened.     OSSEOUS STRUCTURES  No fracture, marrow contusion or marrow infiltration.     LONG BICIPITAL TENDON  The long head of the biceps tendon is normally situated within the  bicipital groove. No complete or partial biceps tendon tear is  present.     GLENOHUMERAL JOINT  Joint fluid: Physiologic amount of joint fluid is  present.     Cartilage and  subarticular bone:  No focal hyaline cartilage defects  are noted. No Hill-Sachs, reverse Hill-Sachs, or bony Bankart lesions  are seen.     Labrum: Limited assessment on this study with relative lack of joint  distention shows no labral tear.     ANCILLARY FINDINGS:  None                                                                      Impression:     Normal MRI of the left shoulder. No MR evidence of recent shoulder  dislocation.              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  Has had a full work-up with rheumatology with a recommendation to start physical therapy.  Her overall exam today was reassuring for other systemic findings, however she remains with multiple subluxations throughout the week causing her significant distress and functionality declining.  MRIs have been discussed today at length.     -Orthopedic surgeon referral order placed in system.  Patient has tried multiple months of physical therapy as well as kinesiotaping and patricio bracing without any improvement.  She would like to discuss possible surgical options if warranted.  -Ibuprofen is not helpful for the pain associated with subluxations.  Diclofenac 75 mg to be used twice daily with food was sent to her pharmacy.     Patient has been seen and discussed with Dr. Chandrakant Miller DO  Primary Care Sports Medicine Fellow      Attending Note:   I have examined this patient/imaging and have reviewed the clinical presentation and progress note with the fellow. I agree with the treatment plan as outlined. The plan was formulated with the fellow on the day of the patient's visit.   Kori Stallings MD, CAQ, CCD  [facility]  Sports Medicine and Bone Health      Mic Miller DO

## 2021-01-04 NOTE — PROGRESS NOTES
Attending Note:   I have examined this patient/imaging and have reviewed the clinical presentation and progress note with the fellow. I agree with the treatment plan as outlined. The plan was formulated with the fellow on the day of the patient's visit.   Kori Stallings MD, CAQ, CCD  [facility]  Sports Medicine and Bone Health

## 2021-01-04 NOTE — TELEPHONE ENCOUNTER
RECORDS RECEIVED FROM: Subluxation of shoulder joint/ Dr Miller/ MRI and XR/ [employer]/ ortho con   DATE RECEIVED: Jan 8, 2021   NOTES STATUS DETAILS   OFFICE NOTE from referring provider Internal    OFFICE NOTE from other specialist N/A    DISCHARGE SUMMARY from hospital N/A    DISCHARGE REPORT from the ER N/A    OPERATIVE REPORT N/A    MEDICATION LIST Internal    IMPLANT RECORD/STICKER N/A    LABS     CBC/DIFF N/A    CULTURES N/A    INJECTIONS DONE IN RADIOLOGY N/A    MRI Internal    CT SCAN N/A    XRAYS (IMAGES & REPORTS) Internal    TUMOR     PATHOLOGY  Slides & report N/A    01/04/21   11:29 AM   Complete  Radhika Owens CMA

## 2021-01-08 ENCOUNTER — PRE VISIT (OUTPATIENT)
Dept: ORTHOPEDICS | Facility: CLINIC | Age: 26
End: 2021-01-08

## 2021-01-14 ENCOUNTER — TRANSFERRED RECORDS (OUTPATIENT)
Dept: HEALTH INFORMATION MANAGEMENT | Facility: CLINIC | Age: 26
End: 2021-01-14

## 2021-01-27 ENCOUNTER — OFFICE VISIT (OUTPATIENT)
Dept: ORTHOPEDICS | Facility: CLINIC | Age: 26
End: 2021-01-27
Attending: STUDENT IN AN ORGANIZED HEALTH CARE EDUCATION/TRAINING PROGRAM
Payer: COMMERCIAL

## 2021-01-27 VITALS — HEIGHT: 63 IN | WEIGHT: 114.2 LBS | BODY MASS INDEX: 20.23 KG/M2

## 2021-01-27 DIAGNOSIS — S43.003D SUBLUXATION OF SHOULDER JOINT, UNSPECIFIED LATERALITY, SUBSEQUENT ENCOUNTER: ICD-10-CM

## 2021-01-27 PROCEDURE — 99204 OFFICE O/P NEW MOD 45 MIN: CPT | Mod: GC | Performed by: ORTHOPAEDIC SURGERY

## 2021-01-27 ASSESSMENT — MIFFLIN-ST. JEOR: SCORE: 1237

## 2021-01-27 NOTE — LETTER
"    1/27/2021         RE: Toshia Claros  [address] [facility] [address]        Dear Colleague,    Thank you for referring your patient, Toshia Claros, to the [facility] [place]. Please see a copy of my visit note below.    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 Her symptoms began in childhood. She notes subluxations on a daily basis. She is able to dislocate and relocate her shoulders independently. Instability especially is triggered by carrying objects, such as grocery bags. Additionally, she notes constant pain in both shoulders, although the R > L. The pain is difficult to localize. She takes diclofenac PO, per sports med; helps more than OTC pain medication but does not eliminate her pain. Has previously tried kinesio taping and patricio bracing without relief.    She follows with our sports colleagues Dr. Miller and Dr. Stallings of sports med. She has been doing formal PT and daily home exercises with weights and cuff strengthening since May/June 2020. She notes minimal improvement, if any, in her instability. She also notes issues with her knees and hips - \"popping\" in the hips and patellar subluxation w/o dislocations.    She is otherwise healthy. Takes adderall for ADD and prazosin for sleep.    She denies h/o shoulder surgery.    Of note, she is applying to medical schools right now. Interested in pediatric specialties. Interview at the [employer] upcoming.      Current Outpatient Medications   Medication Sig Dispense Refill     amphetamine-dextroamphetamine (ADDERALL) 10 MG tablet Take 10 mg by mouth 2 times daily       diclofenac (VOLTAREN) 75 MG EC tablet Take 1 tablet (75 mg) by mouth 2 times daily for 28 days 28 tablet 1     prazosin (MINIPRESS) 1 MG capsule Take 2 mg by mouth At Bedtime          No Known Allergies    SOCIAL " HISTORY:    Home: Lives in [place] and lives in an apartment and lives alone.  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 Applying to medical schools right now.  Hobbies: Backpacking and outdoor activities.   EtOH: denies.  Nicotine: denies.    FAMILY HISTORY: Reviewed in EMR      REVIEW OF SYSTEMS: Positive for that noted in past medical history and history of present illness and otherwise reviewed in EMR     PHYSICAL EXAM:    BUE:  Inspection: no gross deformity  ROM:   - AROM: FF to 180 degrees, Abduction to 180 degrees, ER to 90 degrees, IR to T6   *symmetric  Palpation: Sensation is intact to light touch in the median, radial, ulnar, musculocutaneous, and axillary nerve distributions.  Strength: 4+/5 strength with deltoid, triceps, biceps, wrist flexors.   Circulation: 2+ radial pulse, fingers wwp, cap refill < 2sec    Special tests:    Biceps:    - - TTP at bicipital groove   - Speeds: -   - Rolando Sign: -      Rotator Cuff   - Amy's test (Supra): -   - ER at side (Infra): -   - IR lag sign (Subscap): -   - Lift off (inferior Subscap): -  *Winging of scapula when performing liftoff      Labrum/SLAP   - [place]k: -      Instability:   - Anterior load and shift: Grossly unstable with minimal manipulation.   - Apprehension: -   - Relocation: +  Beighton Score: 8/9    IMAGING:  MRI L and R shoulder were reviewed by me (Dr. Fonseca) and demonstrates:  Labrum appears intact. No rotator cuff tear. Long head of biceps intact. No evidence of bony defects.     ASSESSMENT:    1. Shoulder instability, bilateral  2. Santa-Danlos     PLAN:  We had a long discussion with Toshia regarding her connective tissue disease and options for management of her symptoms. Given that she has already done dedicated PT without improvement it is appropriate to consider surgery. We would offer an open capsular shift and described the surgery and recovery to her in detail.  We also noted that the failure rate for this procedure is higher in patients with connective tissue disease and that if she developed recurrent instability the option exists for allograft capsular reconstruction (Achilles). We emphasized the importance of lifelong rotator cuff strengthening program regardless of what treatment path she chooses.  -- Continue PT, emphasize cuff strengthening  -- Discuss surgery with family and friends. We gave her our number to call if interested in scheduling surgery  -- Pain control DONNA Gonzales MD, PGY-1  Orthopedics    I have personally examined this patient and have reviewed the clinical presentation and progress note with the resident.  I agree with the treatment plan as outlined.  The plan was formulated with the resident on the day of the resident's note.

## 2021-01-27 NOTE — PROGRESS NOTES
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 Her symptoms began in childhood. She notes subluxations on a daily basis. She is able to dislocate and relocate her shoulders independently. Instability especially is triggered by carrying objects, such as grocery bags. Additionally, she notes constant pain in both shoulders, although the R > L. The pain is difficult to localize. She takes diclofenac PO, per sports med; helps more than OTC pain medication but does not eliminate her pain. Has previously tried kinesio taping and patricio bracing without relief.    She follows with our sports colleagues Dr. Miller and Dr. Stallings of sports med. She has been doing formal PT and daily home exercises with weights and cuff strengthening since May/June 2020. She notes minimal improvement, if any, in her instability. She also notes issues with her knees and hips - \"popping\" in the hips and patellar subluxation w/o dislocations.    She is otherwise healthy. Takes adderall for ADD and prazosin for sleep.    She denies h/o shoulder surgery.    Of note, she is applying to medical schools right now. Interested in pediatric specialties. Interview at the [employer] upcoming.      Current Outpatient Medications   Medication Sig Dispense Refill     amphetamine-dextroamphetamine (ADDERALL) 10 MG tablet Take 10 mg by mouth 2 times daily       diclofenac (VOLTAREN) 75 MG EC tablet Take 1 tablet (75 mg) by mouth 2 times daily for 28 days 28 tablet 1     prazosin (MINIPRESS) 1 MG capsule Take 2 mg by mouth At Bedtime          No Known Allergies    SOCIAL HISTORY:    Home: Lives in [place] and lives in an apartment and lives alone.  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 Applying to medical schools right now.  Hobbies: Backpacking and outdoor activities. "   EtOH: denies.  Nicotine: denies.    FAMILY HISTORY: Reviewed in EMR      REVIEW OF SYSTEMS: Positive for that noted in past medical history and history of present illness and otherwise reviewed in EMR     PHYSICAL EXAM:    BUE:  Inspection: no gross deformity  ROM:   - AROM: FF to 180 degrees, Abduction to 180 degrees, ER to 90 degrees, IR to T6   *symmetric  Palpation: Sensation is intact to light touch in the median, radial, ulnar, musculocutaneous, and axillary nerve distributions.  Strength: 4+/5 strength with deltoid, triceps, biceps, wrist flexors.   Circulation: 2+ radial pulse, fingers wwp, cap refill < 2sec    Special tests:    Biceps:    - - TTP at bicipital groove   - Speeds: -   - Rolando Sign: -      Rotator Cuff   - Amy's test (Supra): -   - ER at side (Infra): -   - IR lag sign (Subscap): -   - Lift off (inferior Subscap): -  *Winging of scapula when performing liftoff      Labrum/SLAP   - [place]: -      Instability:   - Anterior load and shift: Grossly unstable with minimal manipulation.   - Apprehension: -   - Relocation: +  Beighton Score: 8/9    IMAGING:  MRI L and R shoulder were reviewed by me (Dr. Fonseca) and demonstrates:  Labrum appears intact. No rotator cuff tear. Long head of biceps intact. No evidence of bony defects.     ASSESSMENT:    1. Shoulder instability, bilateral  2. Santa-Danlos     PLAN:  We had a long discussion with Toshia regarding her connective tissue disease and options for management of her symptoms. Given that she has already done dedicated PT without improvement it is appropriate to consider surgery. We would offer an open capsular shift and described the surgery and recovery to her in detail. We also noted that the failure rate for this procedure is higher in patients with connective tissue disease and that if she developed recurrent instability the option exists for allograft capsular reconstruction (Achilles). We emphasized the importance of lifelong rotator  cuff strengthening program regardless of what treatment path she chooses.  -- Continue PT, emphasize cuff strengthening  -- Discuss surgery with family and friends. We gave her our number to call if interested in scheduling surgery  -- Pain control DONNA Gonzales MD, PGY-1  Orthopedics    I have personally examined this patient and have reviewed the clinical presentation and progress note with the resident.  I agree with the treatment plan as outlined.  The plan was formulated with the resident on the day of the resident's note.

## 2021-01-27 NOTE — NURSING NOTE
"Reason For Visit:   Chief Complaint   Patient presents with     Consult     Bilateral shoulder pain and instability. Right is worse Ref. Dr. Mic Miller       PCP: Latanya Murray  Ref: Dr. Mic Miller    ?  No  Occupation [employer].  Currently working? Yes.  Work status?  Full time.  Date of injury: none    Date of surgery: NA  Type of surgery: NA.  Smoker: No  Request smoking cessation information: No    Right hand dominant    SANE score  Affected shoulder: Bilateral   Right shoulder SANE: 60  Left shoulder SANE: 65-70    Ht 1.6 m (5' 2.99\")   Wt 51.8 kg (114 lb 3.2 oz)   BMI 20.23 kg/m        Pain Assessment  Patient Currently in Pain: Yes  0-10 Pain Scale: 4  Primary Pain Location: Shoulder(Bilateral)  Pain Descriptors: Constant, Aching, Sharp  Alleviating Factors: NSAIDS  Aggravating Factors: Movement    Anel Bowen ATC        "

## 2021-01-29 ENCOUNTER — THERAPY VISIT (OUTPATIENT)
Dept: PHYSICAL THERAPY | Facility: CLINIC | Age: 26
End: 2021-01-29
Payer: COMMERCIAL

## 2021-01-29 DIAGNOSIS — M25.511 SHOULDER PAIN, BILATERAL: ICD-10-CM

## 2021-01-29 DIAGNOSIS — M25.551 PAIN OF BOTH HIP JOINTS: ICD-10-CM

## 2021-01-29 DIAGNOSIS — M25.552 PAIN OF BOTH HIP JOINTS: ICD-10-CM

## 2021-01-29 DIAGNOSIS — M25.512 SHOULDER PAIN, BILATERAL: ICD-10-CM

## 2021-01-29 PROCEDURE — 97110 THERAPEUTIC EXERCISES: CPT | Mod: GP | Performed by: PHYSICAL THERAPIST

## 2021-01-29 PROCEDURE — 97112 NEUROMUSCULAR REEDUCATION: CPT | Mod: GP | Performed by: PHYSICAL THERAPIST

## 2021-01-30 NOTE — PROGRESS NOTES
PROGRESS  REPORT    Progress reporting period is from 11/26/20 to 12/29/21.       SUBJECTIVE  Subjective changes noted by patient: Toshia reports she met with Dr. Alexis and Dr. Fonseca about her shoulder. They agree that she is very hypermobile in her shoulder. Toshia would prefer to defer surgery if possible and would like to continue with PT and long term strengthening      Current pain level is 4/10  .     Changes in function:  none  Adverse reaction to treatment or activity: None    OBJECTIVE  Changes noted in objective findings:  Yes,         Shoulder:   MMT L MMT R   Flex 4+/5 4+/5   Abd 4/5 4/5   ER 4/5 4/5     Weight bearing: Demonstrates good control with weight bearing in high plank and side plank. No apparent instability and good alignment.         ASSESSMENT/PLAN  Updated problem list and treatment plan: Diagnosis 1:   Hypermobility Syndrome    Pain -  hot/cold therapy, manual therapy, splint/taping/bracing/orthotics, education, directional preference exercise and home program  Decreased strength - therapeutic exercise and therapeutic activities  Impaired balance - neuro re-education and therapeutic activities  Decreased proprioception - neuro re-education and therapeutic activities  Impaired muscle performance - neuro re-education  Decreased function - therapeutic activities  STG/LTGs have been met or progress has been made towards goals:  Yes (See Goal flow sheet completed today.)  Assessment of Progress: The patient's condition is improving.  The patient's progress has slowed.  Self Management Plans:  Patient has been instructed in a home treatment program.  I have re-evaluated this patient and find that the nature, scope, duration and intensity of the therapy is appropriate for the medical condition of the patient.  Toshia continues to require the following intervention to meet STG and LTG's:  PT     Recommendations:  This patient would benefit from continued therapy.     Frequency:  1 X a month, once  daily  Duration:  for 3 months        Please refer to the daily flowsheet for treatment today, total treatment time and time spent performing 1:1 timed codes.

## 2021-02-25 ENCOUNTER — THERAPY VISIT (OUTPATIENT)
Dept: PHYSICAL THERAPY | Facility: CLINIC | Age: 26
End: 2021-02-25
Payer: COMMERCIAL

## 2021-02-25 DIAGNOSIS — M25.511 SHOULDER PAIN, BILATERAL: ICD-10-CM

## 2021-02-25 DIAGNOSIS — M25.551 PAIN OF BOTH HIP JOINTS: ICD-10-CM

## 2021-02-25 DIAGNOSIS — M25.512 SHOULDER PAIN, BILATERAL: ICD-10-CM

## 2021-02-25 DIAGNOSIS — M25.552 PAIN OF BOTH HIP JOINTS: ICD-10-CM

## 2021-02-25 PROCEDURE — 97112 NEUROMUSCULAR REEDUCATION: CPT | Mod: GP | Performed by: PHYSICAL THERAPIST

## 2021-02-25 PROCEDURE — 97110 THERAPEUTIC EXERCISES: CPT | Mod: GP | Performed by: PHYSICAL THERAPIST

## 2021-03-18 ENCOUNTER — TRANSFERRED RECORDS (OUTPATIENT)
Dept: HEALTH INFORMATION MANAGEMENT | Facility: CLINIC | Age: 26
End: 2021-03-18

## 2021-03-26 ENCOUNTER — THERAPY VISIT (OUTPATIENT)
Dept: PHYSICAL THERAPY | Facility: CLINIC | Age: 26
End: 2021-03-26
Payer: COMMERCIAL

## 2021-03-26 DIAGNOSIS — M25.552 PAIN OF BOTH HIP JOINTS: ICD-10-CM

## 2021-03-26 DIAGNOSIS — M25.551 PAIN OF BOTH HIP JOINTS: ICD-10-CM

## 2021-03-26 DIAGNOSIS — M25.511 SHOULDER PAIN, BILATERAL: ICD-10-CM

## 2021-03-26 DIAGNOSIS — M25.512 SHOULDER PAIN, BILATERAL: ICD-10-CM

## 2021-03-26 PROCEDURE — 97110 THERAPEUTIC EXERCISES: CPT | Mod: GP | Performed by: PHYSICAL THERAPIST

## 2021-03-26 PROCEDURE — 97112 NEUROMUSCULAR REEDUCATION: CPT | Mod: GP | Performed by: PHYSICAL THERAPIST

## 2021-03-26 NOTE — LETTER
[facility]  [address] [place] [address]  [phone]    2021    Re: Toshia Claros   :   1995  MRN:  [id number]   REFERRING PHYSICIAN:   Fariba GARCIA [facility]    Date of Initial Evaluation: 2021  Visits:  Rxs Used: 14  Reason for Referral:     Pain of both hip joints  Shoulder pain, bilateral    EVALUATION SUMMARY    PROGRESS  REPORT    Progress reporting period is from 21 to 3/26/21.       SUBJECTIVE  Subjective changes noted by patient:   Toshia reports she sarted a new job. She has been doing a lot of computer work and less lifting which has helped her shoulder pain. She is feeling stronger but notes progress does take a lot of time. She notes no changes in number of subluxations at her shoulders but is better able to reduce this with direct attention to tasks   Current pain level is 4/10  .     Previous pain level was  4/10.   Changes in function:  Yes (See Goal flowsheet attached for changes in current functional level)  Adverse reaction to treatment or activity: None    OBJECTIVE  Changes noted in objective findings:  Yes,   No subluxation with dead lift with 8#,   press with 5#, and kettle bell swings with 10#. Demos proper mm activation and requires cues 25% of the time.  Minimal right sided scapular winging in static standing, Moderate with dynamic movements and severe when fatigued.       ASSESSMENT/PLAN  Updated problem list and treatment plan: Diagnosis 1:  Hypermobility Syndrome    Pain -  hot/cold therapy, manual therapy, splint/taping/bracing/orthotics, education, directional preference exercise and home program  Decreased ROM/flexibility - manual therapy and therapeutic exercise  Decreased strength - therapeutic exercise and therapeutic activities  Impaired balance - neuro re-education and therapeutic activities  Decreased proprioception - neuro  re-education and therapeutic activities  Impaired muscle performance - neuro re-education  Decreased function - therapeutic activities  Re: Toshia Claros   :   1995    Impaired posture - neuro re-education  STG/LTGs have been met or progress has been made towards goals:  Yes (See Goal flow sheet completed today.)  Assessment of Progress: The patient's condition is improving.  Self Management Plans:  Patient has been instructed in a home treatment program.  I have re-evaluated this patient and find that the nature, scope, duration and intensity of the therapy is appropriate for the medical condition of the patient.  Toshia continues to require the following intervention to meet STG and LTG's:  PT    Recommendations:  This patient would benefit from continued therapy.     Frequency:  1 X a month, once daily  Duration:  for 3 months      Thank you for your referral.    INQUIRIES  Therapist: Margoth Bar PT  [facility] [address] [place] [address]  Phone: [phone]  Fax: [phone]

## 2021-03-29 NOTE — PROGRESS NOTES
PROGRESS  REPORT    Progress reporting period is from 1/29/21 to 3/26/21.       SUBJECTIVE  Subjective changes noted by patient:   Toshia reports she sarted a new job. She has been doing a lot of computer work and less lifting which has helped her shoulder pain. She is feeling stronger but notes progress does take a lot of time. She notes no changes in number of subluxations at her shoulders but is better able to reduce this with direct attention to tasks   Current pain level is 4/10  .     Previous pain level was  4/10.   Changes in function:  Yes (See Goal flowsheet attached for changes in current functional level)  Adverse reaction to treatment or activity: None    OBJECTIVE  Changes noted in objective findings:  Yes,     No subluxation with dead lift with 8#,   press with 5#, and kettle bell swings with 10#. Demos proper mm activation and requires cues 25% of the time.    Minimal right sided scapular winging in static standing, Moderate with dynamic movements and severe when fatigued.         ASSESSMENT/PLAN  Updated problem list and treatment plan: Diagnosis 1:  Hypermobility Syndrome    Pain -  hot/cold therapy, manual therapy, splint/taping/bracing/orthotics, education, directional preference exercise and home program  Decreased ROM/flexibility - manual therapy and therapeutic exercise  Decreased strength - therapeutic exercise and therapeutic activities  Impaired balance - neuro re-education and therapeutic activities  Decreased proprioception - neuro re-education and therapeutic activities  Impaired muscle performance - neuro re-education  Decreased function - therapeutic activities  Impaired posture - neuro re-education  STG/LTGs have been met or progress has been made towards goals:  Yes (See Goal flow sheet completed today.)  Assessment of Progress: The patient's condition is improving.  Self Management Plans:  Patient has been instructed in a home treatment program.  I have re-evaluated this  patient and find that the nature, scope, duration and intensity of the therapy is appropriate for the medical condition of the patient.  Toshia continues to require the following intervention to meet STG and LTG's:  PT    Recommendations:  This patient would benefit from continued therapy.     Frequency:  1 X a month, once daily  Duration:  for 3 months        Please refer to the daily flowsheet for treatment today, total treatment time and time spent performing 1:1 timed codes.

## 2021-04-02 DIAGNOSIS — S43.003D SUBLUXATION OF SHOULDER JOINT, UNSPECIFIED LATERALITY, SUBSEQUENT ENCOUNTER: Primary | ICD-10-CM

## 2021-04-23 ENCOUNTER — THERAPY VISIT (OUTPATIENT)
Dept: PHYSICAL THERAPY | Facility: CLINIC | Age: 26
End: 2021-04-23
Payer: COMMERCIAL

## 2021-04-23 DIAGNOSIS — M25.551 PAIN OF BOTH HIP JOINTS: ICD-10-CM

## 2021-04-23 DIAGNOSIS — M25.512 SHOULDER PAIN, BILATERAL: ICD-10-CM

## 2021-04-23 DIAGNOSIS — M25.511 SHOULDER PAIN, BILATERAL: ICD-10-CM

## 2021-04-23 DIAGNOSIS — M25.552 PAIN OF BOTH HIP JOINTS: ICD-10-CM

## 2021-04-23 PROCEDURE — 97112 NEUROMUSCULAR REEDUCATION: CPT | Mod: GP | Performed by: PHYSICAL THERAPIST

## 2021-04-23 PROCEDURE — 97110 THERAPEUTIC EXERCISES: CPT | Mod: GP | Performed by: PHYSICAL THERAPIST

## 2021-05-21 ENCOUNTER — TRANSFERRED RECORDS (OUTPATIENT)
Dept: HEALTH INFORMATION MANAGEMENT | Facility: CLINIC | Age: 26
End: 2021-05-21

## 2021-06-11 ENCOUNTER — ANCILLARY PROCEDURE (OUTPATIENT)
Dept: CT IMAGING | Facility: CLINIC | Age: 26
End: 2021-06-11
Attending: INTERNAL MEDICINE
Payer: COMMERCIAL

## 2021-06-11 DIAGNOSIS — R10.13 POSTPRANDIAL EPIGASTRIC PAIN: ICD-10-CM

## 2021-06-11 DIAGNOSIS — K59.04 CHRONIC IDIOPATHIC CONSTIPATION: ICD-10-CM

## 2021-06-11 DIAGNOSIS — R14.0 BLOATING: ICD-10-CM

## 2021-06-11 DIAGNOSIS — R11.2 NON-INTRACTABLE VOMITING WITH NAUSEA: ICD-10-CM

## 2021-06-11 PROCEDURE — 74177 CT ABD & PELVIS W/CONTRAST: CPT | Performed by: RADIOLOGY

## 2021-06-11 RX ORDER — IOPAMIDOL 755 MG/ML
70 INJECTION, SOLUTION INTRAVASCULAR ONCE
Status: COMPLETED | OUTPATIENT
Start: 2021-06-11 | End: 2021-06-11

## 2021-06-11 RX ADMIN — IOPAMIDOL 70 ML: 755 INJECTION, SOLUTION INTRAVASCULAR at 07:51

## 2021-06-21 ENCOUNTER — TRANSFERRED RECORDS (OUTPATIENT)
Dept: HEALTH INFORMATION MANAGEMENT | Facility: CLINIC | Age: 26
End: 2021-06-21

## 2021-06-25 ENCOUNTER — ANCILLARY PROCEDURE (OUTPATIENT)
Dept: ULTRASOUND IMAGING | Facility: CLINIC | Age: 26
End: 2021-06-25
Attending: INTERNAL MEDICINE
Payer: COMMERCIAL

## 2021-06-25 ENCOUNTER — THERAPY VISIT (OUTPATIENT)
Dept: PHYSICAL THERAPY | Facility: CLINIC | Age: 26
End: 2021-06-25
Payer: COMMERCIAL

## 2021-06-25 DIAGNOSIS — R93.5 ABNORMAL CT SCAN, PELVIS: ICD-10-CM

## 2021-06-25 DIAGNOSIS — M25.551 PAIN OF BOTH HIP JOINTS: ICD-10-CM

## 2021-06-25 DIAGNOSIS — M25.552 PAIN OF BOTH HIP JOINTS: ICD-10-CM

## 2021-06-25 DIAGNOSIS — M25.511 SHOULDER PAIN, BILATERAL: ICD-10-CM

## 2021-06-25 DIAGNOSIS — M25.512 SHOULDER PAIN, BILATERAL: ICD-10-CM

## 2021-06-25 PROCEDURE — 76856 US EXAM PELVIC COMPLETE: CPT | Mod: GC | Performed by: RADIOLOGY

## 2021-06-25 PROCEDURE — 97110 THERAPEUTIC EXERCISES: CPT | Mod: GP | Performed by: PHYSICAL THERAPIST

## 2021-06-25 PROCEDURE — 97112 NEUROMUSCULAR REEDUCATION: CPT | Mod: GP | Performed by: PHYSICAL THERAPIST

## 2021-07-08 ENCOUNTER — TRANSFERRED RECORDS (OUTPATIENT)
Dept: HEALTH INFORMATION MANAGEMENT | Facility: CLINIC | Age: 26
End: 2021-07-08
Payer: COMMERCIAL

## 2021-07-15 ENCOUNTER — TRANSFERRED RECORDS (OUTPATIENT)
Dept: HEALTH INFORMATION MANAGEMENT | Facility: CLINIC | Age: 26
End: 2021-07-15

## 2021-07-15 LAB
ALT SERPL-CCNC: 22 U/L (ref 0–78)
AST SERPL-CCNC: 12 U/L (ref 0–37)
CREATININE (EXTERNAL): 0.7 MG/DL (ref 0.6–1.02)
GLUCOSE (EXTERNAL): 91 MG/DL (ref 74–100)
POTASSIUM (EXTERNAL): 3.4 MMOL/L (ref 3.5–5.1)
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
## 2021-07-16 ENCOUNTER — OFFICE VISIT (OUTPATIENT)
Dept: FAMILY MEDICINE | Facility: CLINIC | Age: 26
End: 2021-07-16
Payer: COMMERCIAL

## 2021-07-16 VITALS
HEIGHT: 64 IN | HEART RATE: 112 BPM | TEMPERATURE: 97.9 F | BODY MASS INDEX: 19.91 KG/M2 | OXYGEN SATURATION: 97 % | DIASTOLIC BLOOD PRESSURE: 66 MMHG | SYSTOLIC BLOOD PRESSURE: 101 MMHG

## 2021-07-16 DIAGNOSIS — Z00.00 ROUTINE GENERAL MEDICAL EXAMINATION AT A HEALTH CARE FACILITY: Primary | ICD-10-CM

## 2021-07-16 DIAGNOSIS — K59.04 CHRONIC IDIOPATHIC CONSTIPATION: ICD-10-CM

## 2021-07-16 DIAGNOSIS — M79.89 SWELLING OF LIMB: ICD-10-CM

## 2021-07-16 DIAGNOSIS — R11.2 NAUSEA AND VOMITING, INTRACTABILITY OF VOMITING NOT SPECIFIED, UNSPECIFIED VOMITING TYPE: ICD-10-CM

## 2021-07-16 DIAGNOSIS — K58.2 IRRITABLE BOWEL SYNDROME WITH BOTH CONSTIPATION AND DIARRHEA: ICD-10-CM

## 2021-07-16 DIAGNOSIS — Q79.60 EHLERS-DANLOS SYNDROME: ICD-10-CM

## 2021-07-16 DIAGNOSIS — R34 DECREASED URINATION: ICD-10-CM

## 2021-07-16 LAB
ALBUMIN UR-MCNC: ABNORMAL MG/DL
APPEARANCE UR: CLEAR
BILIRUB UR QL STRIP: ABNORMAL
COLOR UR AUTO: YELLOW
GLUCOSE UR STRIP-MCNC: NEGATIVE MG/DL
HCV AB SERPL QL IA: NONREACTIVE
HGB UR QL STRIP: ABNORMAL
HIV 1+2 AB+HIV1 P24 AG SERPL QL IA: NONREACTIVE
KETONES UR STRIP-MCNC: NEGATIVE MG/DL
LEUKOCYTE ESTERASE UR QL STRIP: ABNORMAL
NITRATE UR QL: NEGATIVE
PH UR STRIP: 5.5 [PH] (ref 5–7)
RBC #/AREA URNS AUTO: NORMAL /HPF
SP GR UR STRIP: 1.02 (ref 1–1.03)
T PALLIDUM AB SER QL: NONREACTIVE
UROBILINOGEN UR STRIP-ACNC: 0.2 E.U./DL
WBC #/AREA URNS AUTO: NORMAL /HPF

## 2021-07-16 PROCEDURE — 80048 BASIC METABOLIC PNL TOTAL CA: CPT | Performed by: FAMILY MEDICINE

## 2021-07-16 PROCEDURE — 36415 COLL VENOUS BLD VENIPUNCTURE: CPT | Performed by: FAMILY MEDICINE

## 2021-07-16 PROCEDURE — 99395 PREV VISIT EST AGE 18-39: CPT | Performed by: FAMILY MEDICINE

## 2021-07-16 PROCEDURE — 81001 URINALYSIS AUTO W/SCOPE: CPT | Performed by: FAMILY MEDICINE

## 2021-07-16 PROCEDURE — 86780 TREPONEMA PALLIDUM: CPT | Performed by: FAMILY MEDICINE

## 2021-07-16 PROCEDURE — 87389 HIV-1 AG W/HIV-1&-2 AB AG IA: CPT | Performed by: FAMILY MEDICINE

## 2021-07-16 PROCEDURE — 86803 HEPATITIS C AB TEST: CPT | Performed by: FAMILY MEDICINE

## 2021-07-16 PROCEDURE — 82043 UR ALBUMIN QUANTITATIVE: CPT | Performed by: FAMILY MEDICINE

## 2021-07-16 ASSESSMENT — ENCOUNTER SYMPTOMS
HEADACHES: 0
HEARTBURN: 0
JOINT SWELLING: 0
HEMATURIA: 0
FEVER: 0
SORE THROAT: 0
ARTHRALGIAS: 1
CHILLS: 0
PALPITATIONS: 0
ABDOMINAL PAIN: 0
HEMATOCHEZIA: 0
DYSURIA: 0
NAUSEA: 1
FREQUENCY: 0
EYE PAIN: 0
WEAKNESS: 0
PARESTHESIAS: 0
MYALGIAS: 0
COUGH: 0
NERVOUS/ANXIOUS: 0
SHORTNESS OF BREATH: 0
BREAST MASS: 0
CONSTIPATION: 1
DIARRHEA: 1
DIZZINESS: 0

## 2021-07-17 LAB
ANION GAP SERPL CALCULATED.3IONS-SCNC: 8 MMOL/L (ref 3–14)
BUN SERPL-MCNC: 12 MG/DL (ref 7–30)
CALCIUM SERPL-MCNC: 9 MG/DL (ref 8.5–10.1)
CHLORIDE BLD-SCNC: 107 MMOL/L (ref 94–109)
CO2 SERPL-SCNC: 25 MMOL/L (ref 20–32)
CREAT SERPL-MCNC: 0.76 MG/DL (ref 0.52–1.04)
CREAT UR-MCNC: 300 MG/DL
GFR SERPL CREATININE-BSD FRML MDRD: >90 ML/MIN/1.73M2
GLUCOSE BLD-MCNC: 87 MG/DL (ref 70–99)
MICROALBUMIN UR-MCNC: 22 MG/DL
MICROALBUMIN/CREAT UR: 7.33 MG/G CR (ref 0–25)
POTASSIUM BLD-SCNC: 3.3 MMOL/L (ref 3.4–5.3)
SODIUM SERPL-SCNC: 140 MMOL/L (ref 133–144)

## 2021-07-21 PROBLEM — M35.7 HYPERMOBILITY SYNDROME: Status: ACTIVE | Noted: 2020-07-13

## 2021-07-21 NOTE — RESULT ENCOUNTER NOTE
Here are your lab results from your recent visit...  -Your basic metabolic panel (which includes electrolyte levels, blood sugar level and kidney function tests) looks very good (other than the just slightly low potassium).  -Your microalbumin level (which is the urine test that can signal signs of early chronic kidney disease if elevated to >30) looks good as well.  -Your UA had some mild abnormalities, but the urine microalbumin is reassuring for the normal protein levels and the urine micro was reassuring for no concerning blood or white blood cells.    We should discuss these labs and your symptoms further at your follow-up appointment.     Nader Gibbs MD

## 2021-09-09 NOTE — PROGRESS NOTES
Assessment & Plan       ICD-10-CM    1. Multiple joint pain  M25.50 Pain Management Referral   2. Hypermobility arthralgia  M25.50 Pain Management Referral   3. Bilateral leg edema  R60.0 Orthotics, Prosthetics and Custom Compression Order for DME - ONLY FOR DME      Hypermobility/joint pains-   Discussed rheumatology consult from 4/20 note with pt- re: Dr. Baeza's conclusion that the correct dx was hypermobility syndrome and not Santa-Danlos or Marfan's syndrome (though she did think pt's Beighton score was 7/9) but pt thinks that is a mistake, that they are not looking at the most updated criteria.  No confirmatory testing available for this type of Santa-Danlos.  Treatment remains similar regardless of the diagnosis, however.  Pt has found just a bit of help with PT, but still significant joint pain and dislocation issues.  She met with ortho to discuss potential surgery for shoulder pain/dislocations, but they did not think there was a high likelihood of success with surgery, and pt agrees with this assessment.  [facility] on further treatment/management options.    Bilateral leg edema- most if standing for awhile, labs normal at last check.  Rec rx compression stockings - DME order given today.        35 minutes spent on the date of the encounter doing chart review, review of outside records, review of test results, interpretation of tests, patient visit and documentation     Екатерина Cool MD  [facility]            Roxana Pope is a 26 year old who presents for the following health issues joints pains, hypermobility, leg swelling and lab f/u    HPI     Concern - Discuss 7/16/2021 Results        Joint sx's--  Not great, the same.  Shoulders are the worst, hips are also a problem.    Fingers and jaw and knees are also problematic.  Did PT for awhile, through  [employer].    Still trying to do the exercises- may help a  "bit.  Saw Dr. Stallings, gave her option of shoulder surgery.  Pt would like to avoid it if she could.  Both sides dislocate- up to daily if she does certain things.  R>L.    GI sx's-  Main sx's- recurring nausea, vomiting, both types of IBS-combined, slow intestinal motility.  Seeing Dr. Anel Merino, who she likes, seeing her every few months.  Trying different medications for the motility, which has not been super effective.  Has zofran for nausea, which used to help, but hasn't lately.  Linzess did not help, neither did amatiza, nor one other.  Motregity.  If she mixes this with the linzess, they help some, but they don't work on their own.      Edema-   Still occurs, on/off, mostly in ankles/legs.  Worse at work where she does a lot of standing and walking.  Or she's drank too much water.  Has worn compression stockings at work- [employer]- knee-high.  Helps with her ankles, but can feel the swelling above her knees when it's bad.      Psych/PTSD-  On prazosin 2mg nightly- Saloni Ly, RONAL.  On Adderall 15mg in am, and 10mg in afternoon prn.      Review of Systems   Constitutional, HEENT, cardiovascular, pulmonary, gi and gu systems are negative, except as otherwise noted.        Objective    /76   Pulse (!) 132   Temp 97.5  F (36.4  C)   Resp 16   Ht 1.613 m (5' 3.5\")   Wt 46.2 kg (101 lb 12.8 oz)   LMP 08/10/2021   SpO2 97%   BMI 17.75 kg/m    Body mass index is 17.75 kg/m .  Physical Exam   GENERAL APPEARANCE: healthy, alert and no distress     EYES: PERRL, sclera clear     NECK: no adenopathy, no asymmetry, masses, or scars and thyroid normal to palpation     RESP: lungs clear to auscultation - no rales, rhonchi or wheezes     CV: regular rates and rhythm, normal S1 S2, no S3 or S4 and no murmur, click or rub      Abdomen: soft, nontender, no HSM or masses and bowel sounds normal     Ext: warm, dry, no edema      Psych: full range affect, normal speech and grooming, judgement and insight intact "       Office Visit on 07/16/2021   Component Date Value Ref Range Status     HIV Antigen Antibody Combo 07/16/2021 Nonreactive  Nonreactive Final    HIV-1 p24 Ag & HIV-1/HIV-2 Ab Not Detected     Hepatitis C Antibody 07/16/2021 Nonreactive  Nonreactive Final     Sodium 07/16/2021 140  133 - 144 mmol/L Final     Potassium 07/16/2021 3.3* 3.4 - 5.3 mmol/L Final     Chloride 07/16/2021 107  94 - 109 mmol/L Final     Carbon Dioxide (CO2) 07/16/2021 25  20 - 32 mmol/L Final     Anion Gap 07/16/2021 8  3 - 14 mmol/L Final     Urea Nitrogen 07/16/2021 12  7 - 30 mg/dL Final     Creatinine 07/16/2021 0.76  0.52 - 1.04 mg/dL Final     Calcium 07/16/2021 9.0  8.5 - 10.1 mg/dL Final     Glucose 07/16/2021 87  70 - 99 mg/dL Final     GFR Estimate 07/16/2021 >90  >60 mL/min/1.73m2 Final    As of July 11, 2021, eGFR is calculated by the CKD-EPI creatinine equation, without race adjustment. eGFR can be influenced by muscle mass, exercise, and diet. The reported eGFR is an estimation only and is only applicable if the renal function is stable.     Creatinine Urine mg/dL 07/16/2021 300  mg/dL Final     Albumin Urine mg/L 07/16/2021 22  mg/dL Final     Albumin Urine mg/g Cr 07/16/2021 7.33  0.00 - 25.00 mg/g Cr Final     Color Urine 07/16/2021 Yellow  Colorless, Straw, Light Yellow, Yellow Final     Appearance Urine 07/16/2021 Clear  Clear Final     Glucose Urine 07/16/2021 Negative  Negative mg/dL Final     Bilirubin Urine 07/16/2021 Small* Negative Final     Ketones Urine 07/16/2021 Negative  Negative mg/dL Final     Specific Gravity Urine 07/16/2021 1.020  1.003 - 1.035 Final     Blood Urine 07/16/2021 Trace* Negative Final     pH Urine 07/16/2021 5.5  5.0 - 7.0 Final     Protein Albumin Urine 07/16/2021 Trace* Negative mg/dL Final     Urobilinogen Urine 07/16/2021 0.2  0.2, 1.0 E.U./dL Final     Nitrite Urine 07/16/2021 Negative  Negative Final     Leukocyte Esterase Urine 07/16/2021 Trace* Negative Final     Treponema Antibody  Total 07/16/2021 Nonreactive  Nonreactive Final     RBC Urine 07/16/2021 0-2  0-2 /HPF /HPF Final     WBC Urine 07/16/2021 0-5  0-5 /HPF /HPF Final

## 2021-09-10 ENCOUNTER — OFFICE VISIT (OUTPATIENT)
Dept: FAMILY MEDICINE | Facility: CLINIC | Age: 26
End: 2021-09-10
Payer: COMMERCIAL

## 2021-09-10 VITALS
WEIGHT: 101.8 LBS | RESPIRATION RATE: 16 BRPM | BODY MASS INDEX: 17.38 KG/M2 | TEMPERATURE: 97.5 F | DIASTOLIC BLOOD PRESSURE: 76 MMHG | HEIGHT: 64 IN | HEART RATE: 132 BPM | SYSTOLIC BLOOD PRESSURE: 112 MMHG | OXYGEN SATURATION: 97 %

## 2021-09-10 DIAGNOSIS — K59.04 CHRONIC IDIOPATHIC CONSTIPATION: ICD-10-CM

## 2021-09-10 DIAGNOSIS — M25.50 MULTIPLE JOINT PAIN: Primary | ICD-10-CM

## 2021-09-10 DIAGNOSIS — M25.50 HYPERMOBILITY ARTHRALGIA: ICD-10-CM

## 2021-09-10 DIAGNOSIS — M35.7 HYPERMOBILITY SYNDROME: ICD-10-CM

## 2021-09-10 DIAGNOSIS — R60.0 BILATERAL LEG EDEMA: ICD-10-CM

## 2021-09-10 PROCEDURE — 99214 OFFICE O/P EST MOD 30 MIN: CPT | Performed by: FAMILY MEDICINE

## 2021-09-10 ASSESSMENT — MIFFLIN-ST. JEOR: SCORE: 1183.82

## 2021-09-12 ENCOUNTER — HEALTH MAINTENANCE LETTER (OUTPATIENT)
Age: 26
End: 2021-09-12

## 2021-09-29 ENCOUNTER — TRANSFERRED RECORDS (OUTPATIENT)
Dept: HEALTH INFORMATION MANAGEMENT | Facility: CLINIC | Age: 26
End: 2021-09-29
Payer: COMMERCIAL

## 2021-11-11 PROBLEM — M25.551 PAIN OF BOTH HIP JOINTS: Status: RESOLVED | Noted: 2020-05-04 | Resolved: 2021-11-11

## 2021-11-11 PROBLEM — M25.552 PAIN OF BOTH HIP JOINTS: Status: RESOLVED | Noted: 2020-05-04 | Resolved: 2021-11-11

## 2021-11-11 PROBLEM — M25.511 SHOULDER PAIN, BILATERAL: Status: RESOLVED | Noted: 2020-05-04 | Resolved: 2021-11-11

## 2021-11-11 PROBLEM — M25.512 SHOULDER PAIN, BILATERAL: Status: RESOLVED | Noted: 2020-05-04 | Resolved: 2021-11-11

## 2021-11-11 NOTE — PROGRESS NOTES
Discharge Note    Progress reporting period is from last progress note on   to Jun 25, 2021.    Toshia failed to follow up and current status is unknown.  Please see information below for last relevant information on current status.  Patient seen for 16 visits.    SUBJECTIVE  Subjective changes noted by patient:  Toshia reports eventually she would like to bike and swim for exercise. She has been consistent with her HEP  .  Current pain level is 2/10.     Previous pain level was  4/10.   Changes in function:  Yes (See Goal flowsheet attached for changes in current functional level)  Adverse reaction to treatment or activity: None    OBJECTIVE  Changes noted in objective findings: Able to maintain good shoulder positioning with multiple sets of 's bow with 5#. SPADI  27 today.     ASSESSMENT/PLAN  Diagnosis: Hypermobility syndrome; shoulder   Updated problem list and treatment plan:   Pain - HEP  Decreased ROM/flexibility - HEP  Decreased function - HEP  STG/LTGs have been met or progress has been made towards goals:  Yes, please see goal flowsheet for most current information  Assessment of Progress: current status is unknown.    Last current status: Pt is progressing as expected   Self Management Plans:  HEP  I have re-evaluated this patient and find that the nature, scope, duration and intensity of the therapy is appropriate for the medical condition of the patient.  Toshia continues to require the following intervention to meet STG and LTG's:  HEP.    Recommendations:  Discharge with current home program.  Patient to follow up with MD as needed.    Please refer to the daily flowsheet for treatment today, total treatment time and time spent performing 1:1 timed codes.

## 2021-12-16 ENCOUNTER — TRANSFERRED RECORDS (OUTPATIENT)
Dept: HEALTH INFORMATION MANAGEMENT | Facility: CLINIC | Age: 26
End: 2021-12-16
Payer: COMMERCIAL

## 2021-12-16 LAB
ALT SERPL-CCNC: 12 IU/L (ref 0–32)
AST SERPL-CCNC: 13 IU/L (ref 0–40)
CREATININE (EXTERNAL): 0.81 MG/DL (ref 0.57–1)
GFR ESTIMATED (EXTERNAL): 101 ML/MIN/1.73
GFR ESTIMATED (IF AFRICAN AMERICAN) (EXTERNAL): 117 ML/MIN/1.73
GLUCOSE (EXTERNAL): 91 MG/DL (ref 65–99)
POTASSIUM (EXTERNAL): 3.8 MMOL/L (ref 3.5–5.2)

## 2022-02-26 ENCOUNTER — OFFICE VISIT (OUTPATIENT)
Dept: URGENT CARE | Facility: URGENT CARE | Age: 27
End: 2022-02-26
Payer: COMMERCIAL

## 2022-02-26 VITALS — HEART RATE: 94 BPM | TEMPERATURE: 97.8 F | DIASTOLIC BLOOD PRESSURE: 60 MMHG | SYSTOLIC BLOOD PRESSURE: 94 MMHG

## 2022-02-26 DIAGNOSIS — R10.2 PELVIC PAIN IN FEMALE: Primary | ICD-10-CM

## 2022-02-26 PROCEDURE — 99213 OFFICE O/P EST LOW 20 MIN: CPT | Performed by: FAMILY MEDICINE

## 2022-02-26 ASSESSMENT — PAIN SCALES - GENERAL: PAINLEVEL: SEVERE PAIN (6)

## 2022-02-26 NOTE — PROGRESS NOTES
Subjective: 2-1/2 days ago she developed some left lower quadrant pain. Less than a year ago she had some pain and apparently was thought to have had an ovarian cyst but this pain is much worse, got really bad yesterday, constant, hurts to walk, no fever, decreased appetite. She has not been sexually active for over a year. She is not on birth control pills. Bowels are working normally.    Objective: Vitals are stable. Abdominal exam with pain in the left lower quadrant that is elicited no matter where I push but much worse in the left lower quadrant. This is quite dramatic pain.    Assessment and plan: Left lower quadrant pain, severe, many possibilities including ovarian cyst, diverticulitis, and she really needs at least an ultrasound and may be a CAT scan to sort through the options so she will go to the emergency room now.

## 2022-03-01 ENCOUNTER — TELEPHONE (OUTPATIENT)
Dept: FAMILY MEDICINE | Facility: CLINIC | Age: 27
End: 2022-03-01
Payer: COMMERCIAL

## 2022-03-01 DIAGNOSIS — F43.10 PTSD (POST-TRAUMATIC STRESS DISORDER): ICD-10-CM

## 2022-03-01 NOTE — TELEPHONE ENCOUNTER
Yes, would rec a video or in clinic visit. Unable to see update care everywhere info now, but should be able to at a visit encounter.  Thanks- CW

## 2022-03-01 NOTE — TELEPHONE ENCOUNTER
CW  Patient said she went to ER at [facility], (see also UC visit 2/26/22). Said she had an US and CT and there was an incidental finding, lung nodules and was told to follow up with a MRI.     Did not see imaging in Care Everywhere or recommendations.    Would you prefer a VV to discuss and order testing?    Thank you,  Mitzi Calle, RN  [place]

## 2022-05-03 ENCOUNTER — TRANSFERRED RECORDS (OUTPATIENT)
Dept: HEALTH INFORMATION MANAGEMENT | Facility: CLINIC | Age: 27
End: 2022-05-03
Payer: COMMERCIAL

## 2022-10-13 ENCOUNTER — TRANSFERRED RECORDS (OUTPATIENT)
Dept: HEALTH INFORMATION MANAGEMENT | Facility: CLINIC | Age: 27
End: 2022-10-13

## 2022-11-19 ENCOUNTER — HEALTH MAINTENANCE LETTER (OUTPATIENT)
Age: 27
End: 2022-11-19

## 2023-04-06 ENCOUNTER — TRANSFERRED RECORDS (OUTPATIENT)
Dept: HEALTH INFORMATION MANAGEMENT | Facility: CLINIC | Age: 28
End: 2023-04-06
Payer: COMMERCIAL

## 2023-10-24 ENCOUNTER — OFFICE VISIT (OUTPATIENT)
Dept: FAMILY MEDICINE | Facility: CLINIC | Age: 28
End: 2023-10-24
Payer: COMMERCIAL

## 2023-10-24 VITALS
HEART RATE: 106 BPM | OXYGEN SATURATION: 99 % | HEIGHT: 64 IN | BODY MASS INDEX: 17.75 KG/M2 | RESPIRATION RATE: 16 BRPM | DIASTOLIC BLOOD PRESSURE: 86 MMHG | SYSTOLIC BLOOD PRESSURE: 115 MMHG | TEMPERATURE: 98.4 F

## 2023-10-24 DIAGNOSIS — Z12.4 CERVICAL CANCER SCREENING: ICD-10-CM

## 2023-10-24 DIAGNOSIS — J94.8 PLEURAL MASS: ICD-10-CM

## 2023-10-24 DIAGNOSIS — N93.8 DUB (DYSFUNCTIONAL UTERINE BLEEDING): Primary | ICD-10-CM

## 2023-10-24 LAB
ERYTHROCYTE [DISTWIDTH] IN BLOOD BY AUTOMATED COUNT: 12.7 % (ref 10–15)
HCT VFR BLD AUTO: 44.5 % (ref 35–53)
HGB BLD-MCNC: 15.3 G/DL (ref 11.7–17.7)
MCH RBC QN AUTO: 30 PG (ref 26.5–33)
MCHC RBC AUTO-ENTMCNC: 34.4 G/DL (ref 31.5–36.5)
MCV RBC AUTO: 87 FL (ref 78–100)
PLATELET # BLD AUTO: 357 10E3/UL (ref 150–450)
PROLACTIN SERPL 3RD IS-MCNC: 31 NG/ML (ref 4–23)
RBC # BLD AUTO: 5.1 10E6/UL (ref 3.8–5.9)
TSH SERPL DL<=0.005 MIU/L-ACNC: 3.09 UIU/ML (ref 0.3–4.2)
WBC # BLD AUTO: 6.6 10E3/UL (ref 4–11)

## 2023-10-24 PROCEDURE — 84443 ASSAY THYROID STIM HORMONE: CPT | Performed by: FAMILY MEDICINE

## 2023-10-24 PROCEDURE — 99214 OFFICE O/P EST MOD 30 MIN: CPT | Performed by: FAMILY MEDICINE

## 2023-10-24 PROCEDURE — 85027 COMPLETE CBC AUTOMATED: CPT | Performed by: FAMILY MEDICINE

## 2023-10-24 PROCEDURE — G0145 SCR C/V CYTO,THINLAYER,RESCR: HCPCS | Performed by: FAMILY MEDICINE

## 2023-10-24 PROCEDURE — 36415 COLL VENOUS BLD VENIPUNCTURE: CPT | Performed by: FAMILY MEDICINE

## 2023-10-24 PROCEDURE — 84146 ASSAY OF PROLACTIN: CPT | Performed by: FAMILY MEDICINE

## 2023-10-24 RX ORDER — OLANZAPINE 2.5 MG/1
2.5 TABLET, FILM COATED ORAL AT BEDTIME
COMMUNITY

## 2023-10-24 RX ORDER — FLUOCINONIDE 0.5 MG/G
OINTMENT TOPICAL 2 TIMES DAILY
COMMUNITY
Start: 2023-10-17

## 2023-10-24 RX ORDER — MIDODRINE HYDROCHLORIDE 5 MG/1
5 TABLET ORAL 2 TIMES DAILY
COMMUNITY
Start: 2022-08-18

## 2023-10-24 RX ORDER — FLUOCINONIDE TOPICAL SOLUTION USP, 0.05% 0.5 MG/ML
SOLUTION TOPICAL DAILY
COMMUNITY
Start: 2023-10-17

## 2023-10-24 RX ORDER — METOCLOPRAMIDE 10 MG/1
10 TABLET ORAL DAILY PRN
COMMUNITY
Start: 2022-10-13

## 2023-10-24 RX ORDER — PROMETHAZINE HYDROCHLORIDE 12.5 MG/1
12.5 TABLET ORAL
COMMUNITY

## 2023-10-24 RX ORDER — LUBIPROSTONE 24 UG/1
1 CAPSULE ORAL DAILY
COMMUNITY
Start: 2022-01-25

## 2023-10-24 RX ORDER — MIRTAZAPINE 7.5 MG/1
7.5 TABLET, FILM COATED ORAL AT BEDTIME
COMMUNITY
Start: 2023-06-27

## 2023-10-24 RX ORDER — LINACLOTIDE 290 UG/1
290 CAPSULE, GELATIN COATED ORAL
COMMUNITY
Start: 2022-08-02

## 2023-10-24 RX ORDER — CLONIDINE HYDROCHLORIDE 0.1 MG/1
0.1 TABLET ORAL DAILY
COMMUNITY
Start: 2023-01-10

## 2023-10-24 RX ORDER — LEVONORGESTREL AND ETHINYL ESTRADIOL 0.15-0.03
1 KIT ORAL DAILY
COMMUNITY
Start: 2023-05-08 | End: 2024-01-16

## 2023-10-24 RX ORDER — LEVONORGESTREL AND ETHINYL ESTRADIOL 0.15-0.03
1 KIT ORAL DAILY
Qty: 91 TABLET | Refills: 4 | Status: SHIPPED | OUTPATIENT
Start: 2023-10-24

## 2023-10-24 RX ORDER — PROCHLORPERAZINE MALEATE 5 MG
5 TABLET ORAL DAILY PRN
COMMUNITY
Start: 2022-03-21

## 2023-10-24 RX ORDER — PRUCALOPRIDE 2 MG/1
2 TABLET, FILM COATED ORAL DAILY
COMMUNITY
Start: 2022-07-18

## 2023-10-24 RX ORDER — IVABRADINE 5 MG/1
5 TABLET, FILM COATED ORAL 2 TIMES DAILY
COMMUNITY
Start: 2023-01-17

## 2023-10-24 ASSESSMENT — PAIN SCALES - GENERAL: PAINLEVEL: MODERATE PAIN (4)

## 2023-10-24 NOTE — PROGRESS NOTES
Assessment & Plan     Cervical cancer screening  She got her first PAP smear today   - REVIEW OF HEALTH MAINTENANCE PROTOCOL ORDERS  - Pap screen reflex to HPV if ASCUS - recommend age 25 - 29  -  DUB (dysfunctional uterine bleeding)    She has been on the BC pill back to back but gets BTB almost daily slight and thn every three weeks is like a period , will check CBC , TSh ,    Prolactin; Future  - Prolactin  - CBC with platelets; Future  - TSH with free T4 reflex; Future  - Pap screen reflex to HPV if ASCUS - recommend age 25 - 29  - Ob/Gyn  Referral; Future  - levonorgestrel-ethinyl estradiol (SEASONALE) 0.15-0.03 MG tablet; Take 1 tablet by mouth daily  - TSH with free T4 reflex  - CBC with platelets  Switched her BC pill to Seasonale but also if this does not help , I have advised her to see GYN and placed the referral for this     Pleural mass  Went over the CT scan results that was done beginning of 2022 and there was a finding of a pleural mass/cyst on the right side , discussed it was recommended to follow up with an MRI   Placed the order for the MRI and gave pt the number to schedule it   - MR Chest w/o Contrast; Future    Pt was accompanied today by her therapist Carmen Zamora for support     RTC if no improving or worsening.  Pt is aware  and comfortable with the current plan.      Zeina Jiménez MD  [facility]   Toshia is a 28 year old, presenting for the following health issues:  Vaginal Bleeding  Started back to back the BC and spotting gets from light to a heavy bledding q3 weeks for a week and half       10/24/2023     9:46 AM   Additional Questions   Roomed by RYAN Anders   Accompanied by Floresita - therapist         10/24/2023     9:46 AM   Patient Reported Additional Medications   Patient reports taking the following new medications Pt reports taking multiple new medications   RN notified provider verbally of all reported medications added - Chago FANG  RN    History of Present Illness       Reason for visit:  Perpetual spotting/bleeding while on continuous birth control to stop period  Symptom onset:  More than a month  Symptoms include:  Spotting, bleeding, cramping  Symptom intensity:  Moderate  Symptom progression:  Staying the same  Had these symptoms before:  No    Toshia Claros eats 0-1 servings of fruits and vegetables daily.Toshia Claros consumes 0 sweetened beverage(s) daily.Toshia Claros exercises with enough effort to increase Toshia Claros's heart rate 20 to 29 minutes per day.  Toshia Claros exercises with enough effort to increase Toshia Claros's heart rate 4 days per week. Toshia Claros is missing 2 dose(s) of medications per week.  Toshia Claros is not taking prescribed medications regularly due to remembering to take.             Review of Systems   Constitutional, HEENT, cardiovascular, pulmonary, GI, , musculoskeletal, neuro, skin, endocrine and psych systems are negative, except as otherwise noted.      Objective    There were no vitals taken for this visit.  There is no height or weight on file to calculate BMI.  Physical Exam   GENERAL: healthy, alert and no distress  EYES: Eyes grossly normal to inspection, PERRL and conjunctivae and sclerae normal  NECK: no adenopathy, no asymmetry, masses, or scars and thyroid normal to palpation  RESP: lungs clear to auscultation - no rales, rhonchi or wheezes  CV: regular rate and rhythm, normal S1 S2, no S3 or S4, no murmur, click or rub, no peripheral edema and peripheral pulses strong  ABDOMEN: soft, nontender, no hepatosplenomegaly, no masses and bowel sounds normal   (female): normal female external genitalia, normal urethral meatus, vaginal mucosa, normal cervix/adnexa/uterus without masses or discharge  MS: no gross musculoskeletal defects noted, no edema    Results for orders placed or performed in visit on 10/24/23 (from the past 24 hour(s))   Prolactin   Result Value  "Ref Range    Prolactin 31 (H) 4 - 23 ng/mL    Narrative    The generation of reference intervals for this test is currently based on binary male or female sex. If the electronic health record information indicates another gender identity or if Legal Sex is recorded as \"Unknown\", both male and female reference intervals are provided where applicable, and should be considered according to the individual's appropriate clinical context.   TSH with free T4 reflex   Result Value Ref Range    TSH 3.09 0.30 - 4.20 uIU/mL   CBC with platelets   Result Value Ref Range    WBC Count 6.6 4.0 - 11.0 10e3/uL    RBC Count 5.10 3.80 - 5.90 10e6/uL    Hemoglobin 15.3 11.7 - 17.7 g/dL    Hematocrit 44.5 35.0 - 53.0 %    MCV 87 78 - 100 fL    MCH 30.0 26.5 - 33.0 pg    MCHC 34.4 31.5 - 36.5 g/dL    RDW 12.7 10.0 - 15.0 %    Platelet Count 357 150 - 450 10e3/uL    Narrative    The generation of reference intervals for this test is currently based on binary male or female sex. If the electronic health record information indicates another gender identity or if Legal Sex is recorded as \"Unknown\", both male and female reference intervals are provided where applicable, and should be considered according to the individual's appropriate clinical context.                     "

## 2023-10-24 NOTE — PATIENT INSTRUCTIONS
We did a PAP smear today and labs   Stop current birth control pill , I have sent a prescription for Seasonale which is taken back to back and hopefully will reduce the spotting   If the spotting continues ( after a couple of months on Seasonale ) I have placed a referral for GYN for you to see in a follow up to discuss other methods   I have placed an order for the MRI to follow up on the pleural cyst/ mass - if you decide to do it , call [phone] to schedule it

## 2023-10-26 LAB
BKR LAB AP GYN ADEQUACY: NORMAL
BKR LAB AP GYN INTERPRETATION: NORMAL
BKR LAB AP HPV REFLEX: NORMAL
BKR LAB AP LMP: NORMAL
BKR LAB AP PREVIOUS ABNORMAL: NORMAL
PATH REPORT.COMMENTS IMP SPEC: NORMAL
PATH REPORT.COMMENTS IMP SPEC: NORMAL
PATH REPORT.RELEVANT HX SPEC: NORMAL

## 2023-11-14 ENCOUNTER — TRANSFERRED RECORDS (OUTPATIENT)
Dept: HEALTH INFORMATION MANAGEMENT | Facility: CLINIC | Age: 28
End: 2023-11-14

## 2023-12-05 ASSESSMENT — ENCOUNTER SYMPTOMS
NAUSEA: 1
DYSURIA: 0
JOINT SWELLING: 1
FEVER: 0
CONSTIPATION: 1
CHILLS: 0
COUGH: 0
SORE THROAT: 0
HEMATURIA: 0
WEAKNESS: 1
EYE PAIN: 0
PALPITATIONS: 0
FREQUENCY: 0
SHORTNESS OF BREATH: 1
HEMATOCHEZIA: 0
DIZZINESS: 1
HEARTBURN: 0
ARTHRALGIAS: 1
PARESTHESIAS: 1
MYALGIAS: 0
NERVOUS/ANXIOUS: 1
ABDOMINAL PAIN: 1
HEADACHES: 0
BREAST MASS: 0
DIARRHEA: 1

## 2023-12-12 ENCOUNTER — OFFICE VISIT (OUTPATIENT)
Dept: FAMILY MEDICINE | Facility: CLINIC | Age: 28
End: 2023-12-12
Payer: COMMERCIAL

## 2023-12-12 VITALS
DIASTOLIC BLOOD PRESSURE: 84 MMHG | HEIGHT: 64 IN | RESPIRATION RATE: 18 BRPM | OXYGEN SATURATION: 97 % | BODY MASS INDEX: 17.75 KG/M2 | SYSTOLIC BLOOD PRESSURE: 117 MMHG | HEART RATE: 104 BPM

## 2023-12-12 DIAGNOSIS — H53.2 DIPLOPIA: ICD-10-CM

## 2023-12-12 DIAGNOSIS — M24.80 HYPERMOBILE JOINT SYNDROME OF MULTIPLE SITES: ICD-10-CM

## 2023-12-12 DIAGNOSIS — H55.00 NYSTAGMUS: ICD-10-CM

## 2023-12-12 DIAGNOSIS — Z00.00 ENCOUNTER FOR HEALTH MAINTENANCE EXAMINATION IN ADULT: ICD-10-CM

## 2023-12-12 DIAGNOSIS — F32.81 PMDD (PREMENSTRUAL DYSPHORIC DISORDER): ICD-10-CM

## 2023-12-12 DIAGNOSIS — N93.9 ABNORMAL UTERINE BLEEDING (AUB): ICD-10-CM

## 2023-12-12 DIAGNOSIS — R79.89 ELEVATED PROLACTIN LEVEL: ICD-10-CM

## 2023-12-12 DIAGNOSIS — Z23 HIGH PRIORITY FOR 2019-NCOV VACCINE: ICD-10-CM

## 2023-12-12 DIAGNOSIS — Z23 NEED FOR PROPHYLACTIC VACCINATION AND INOCULATION AGAINST INFLUENZA: ICD-10-CM

## 2023-12-12 DIAGNOSIS — R20.2 PARESTHESIA: ICD-10-CM

## 2023-12-12 DIAGNOSIS — Z76.89 ENCOUNTER TO ESTABLISH CARE: Primary | ICD-10-CM

## 2023-12-12 LAB
CHOLEST SERPL-MCNC: 197 MG/DL
FASTING STATUS PATIENT QL REPORTED: ABNORMAL
FASTING STATUS PATIENT QL REPORTED: NORMAL
GLUCOSE SERPL-MCNC: 84 MG/DL (ref 70–99)
HDLC SERPL-MCNC: 64 MG/DL
LDLC SERPL CALC-MCNC: 112 MG/DL
NONHDLC SERPL-MCNC: 133 MG/DL
PROLACTIN SERPL 3RD IS-MCNC: 20 NG/ML (ref 4–23)
TRIGL SERPL-MCNC: 106 MG/DL
VIT B12 SERPL-MCNC: 386 PG/ML (ref 232–1245)

## 2023-12-12 PROCEDURE — 84146 ASSAY OF PROLACTIN: CPT | Performed by: STUDENT IN AN ORGANIZED HEALTH CARE EDUCATION/TRAINING PROGRAM

## 2023-12-12 PROCEDURE — 82947 ASSAY GLUCOSE BLOOD QUANT: CPT | Performed by: STUDENT IN AN ORGANIZED HEALTH CARE EDUCATION/TRAINING PROGRAM

## 2023-12-12 PROCEDURE — 90471 IMMUNIZATION ADMIN: CPT | Performed by: STUDENT IN AN ORGANIZED HEALTH CARE EDUCATION/TRAINING PROGRAM

## 2023-12-12 PROCEDURE — 99213 OFFICE O/P EST LOW 20 MIN: CPT | Mod: 25 | Performed by: STUDENT IN AN ORGANIZED HEALTH CARE EDUCATION/TRAINING PROGRAM

## 2023-12-12 PROCEDURE — 99395 PREV VISIT EST AGE 18-39: CPT | Mod: 25 | Performed by: STUDENT IN AN ORGANIZED HEALTH CARE EDUCATION/TRAINING PROGRAM

## 2023-12-12 PROCEDURE — 90480 ADMN SARSCOV2 VAC 1/ONLY CMP: CPT | Performed by: STUDENT IN AN ORGANIZED HEALTH CARE EDUCATION/TRAINING PROGRAM

## 2023-12-12 PROCEDURE — 36415 COLL VENOUS BLD VENIPUNCTURE: CPT | Performed by: STUDENT IN AN ORGANIZED HEALTH CARE EDUCATION/TRAINING PROGRAM

## 2023-12-12 PROCEDURE — 90686 IIV4 VACC NO PRSV 0.5 ML IM: CPT | Performed by: STUDENT IN AN ORGANIZED HEALTH CARE EDUCATION/TRAINING PROGRAM

## 2023-12-12 PROCEDURE — 80061 LIPID PANEL: CPT | Performed by: STUDENT IN AN ORGANIZED HEALTH CARE EDUCATION/TRAINING PROGRAM

## 2023-12-12 PROCEDURE — 91320 SARSCV2 VAC 30MCG TRS-SUC IM: CPT | Performed by: STUDENT IN AN ORGANIZED HEALTH CARE EDUCATION/TRAINING PROGRAM

## 2023-12-12 PROCEDURE — 82607 VITAMIN B-12: CPT | Performed by: STUDENT IN AN ORGANIZED HEALTH CARE EDUCATION/TRAINING PROGRAM

## 2023-12-12 RX ORDER — DEXTROAMPHETAMINE SACCHARATE, AMPHETAMINE ASPARTATE, DEXTROAMPHETAMINE SULFATE AND AMPHETAMINE SULFATE 3.75; 3.75; 3.75; 3.75 MG/1; MG/1; MG/1; MG/1
15 TABLET ORAL 2 TIMES DAILY
COMMUNITY

## 2023-12-12 RX ORDER — PRAZOSIN HYDROCHLORIDE 2 MG/1
2 CAPSULE ORAL AT BEDTIME
COMMUNITY

## 2023-12-12 ASSESSMENT — ANXIETY QUESTIONNAIRES
2. NOT BEING ABLE TO STOP OR CONTROL WORRYING: NEARLY EVERY DAY
3. WORRYING TOO MUCH ABOUT DIFFERENT THINGS: NEARLY EVERY DAY
IF YOU CHECKED OFF ANY PROBLEMS ON THIS QUESTIONNAIRE, HOW DIFFICULT HAVE THESE PROBLEMS MADE IT FOR YOU TO DO YOUR WORK, TAKE CARE OF THINGS AT HOME, OR GET ALONG WITH OTHER PEOPLE: VERY DIFFICULT
GAD7 TOTAL SCORE: 14
5. BEING SO RESTLESS THAT IT IS HARD TO SIT STILL: NOT AT ALL
1. FEELING NERVOUS, ANXIOUS, OR ON EDGE: NEARLY EVERY DAY
GAD7 TOTAL SCORE: 14
6. BECOMING EASILY ANNOYED OR IRRITABLE: SEVERAL DAYS
7. FEELING AFRAID AS IF SOMETHING AWFUL MIGHT HAPPEN: MORE THAN HALF THE DAYS

## 2023-12-12 ASSESSMENT — ENCOUNTER SYMPTOMS
DIARRHEA: 1
FREQUENCY: 0
HEMATURIA: 0
DYSURIA: 0
MYALGIAS: 0
CHILLS: 0
HEADACHES: 0
PALPITATIONS: 0
CONSTIPATION: 1
NERVOUS/ANXIOUS: 1
FEVER: 0
ABDOMINAL PAIN: 1
DIZZINESS: 1
NAUSEA: 1
JOINT SWELLING: 1
ARTHRALGIAS: 1
COUGH: 0
SHORTNESS OF BREATH: 1
EYE PAIN: 1
WEAKNESS: 1
SORE THROAT: 0

## 2023-12-12 ASSESSMENT — PATIENT HEALTH QUESTIONNAIRE - PHQ9
5. POOR APPETITE OR OVEREATING: MORE THAN HALF THE DAYS
SUM OF ALL RESPONSES TO PHQ QUESTIONS 1-9: 20

## 2023-12-12 NOTE — PROGRESS NOTES
Answers submitted by the patient for this visit:  Annual Preventive Visit (Submitted on 12/5/2023)  Chief Complaint: Annual Exam:  Blood in stool: No  heartburn: No  peripheral edema: Yes  mood changes: Yes  Skin sensation changes: Yes  pelvic pain: Yes  vaginal bleeding: Yes  vaginal discharge: Yes  tenderness: No  breast mass: No  breast discharge: No  impotence: No  penile discharge: No     SUBJECTIVE:   Toshia is a 28 year old, presenting for the following:  Physical (Annual check up ), Menstrual Problem (Spotting since May ), Tingling (Numbness and tingling (Pins and needles in arms and legs) ongoing for several months ), Eye Problem (Patient states when she looks certain directions she gets double vision ongoing for a year ), Imm/Inj (Flu Shot - COVID-19 VACCINE), and New Patient (Establishing care )        12/12/2023     9:46 AM   Additional Questions   Roomed by Akin   Accompanied by Self       Healthy Habits:     Getting at least 3 servings of Calcium per day:  NO    Bi-annual eye exam:  Yes    Dental care twice a year:  NO    Sleep apnea or symptoms of sleep apnea:  None    Diet:  Other    Frequency of exercise:  2-3 days/week    Duration of exercise:  Other    Taking medications regularly:  No    Barriers to taking medications:  Problems remembering to take them and Other    Additional concerns today:  Yes      New problems:   Felt like last PCP was dismissive     Eyes:   Side to side notices double vision. Has noticed it at least a year. Probably the same. No other vision probs. Saw an eye doctor in September - just sees the eye doctor in [facility].     Tingling - has been happening in feet just when standing. No positional component. Feet, legs. Sometimes hands and lower arms. Symmetric. Has been at least two months. No attributable factors.   - hard to move them right, they get stuck, can't pick things up   - no change in appearance   - vegetarian/pescatarian   - can happen just in one vs the other  "    Suspects she has hEDS   Rheum said hypermobile joint disorder     Doesn't know whta's abnormal unless you tell them bc they're used to a lot. Thinks maybe she underexplains the things she has bc she doesn't think they're abnormal.     L eye twitching for a month     Menstrual stuff: diagnosed with PMDD. Went on cont COCs, mid-may started spotting and hasn't stopped since then. Saw someone in October and switched BC to a different one.   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 No          No data to display              Reviewed orders with patient.  Reviewed health maintenance and updated orders accordingly - Yes      Breast Cancer Screenin/16/2021     8:26 AM   Breast CA Risk Assessment (FHS-7)   Do you have a family history of breast, colon, or ovarian cancer? No / Unknown         Patient under 40 years of age: Routine Mammogram Screening not recommended.     Pertinent mammograms are reviewed under the imaging tab.    History of abnormal Pap smear: NO - age 30- 65 PAP every 3 years recommended      10/24/2023    10:21 AM   PAP / HPV   PAP Negative for Intraepithelial Lesion or Malignancy (NILM)      Reviewed and updated as needed this visit by clinical staff   Tobacco  Allergies  Meds  Problems  Med Hx  Surg Hx  Fam Hx          Reviewed and updated as needed this visit by " "Provider   Tobacco  Allergies  Meds  Problems  Med Hx  Surg Hx  Fam Hx             Review of Systems   Constitutional:  Negative for chills and fever.   HENT:  Negative for congestion, ear pain, hearing loss and sore throat.    Eyes:  Positive for pain and visual disturbance.   Respiratory:  Positive for shortness of breath. Negative for cough.    Cardiovascular:  Positive for chest pain. Negative for palpitations.   Gastrointestinal:  Positive for abdominal pain, constipation, diarrhea and nausea.   Genitourinary:  Positive for genital sores. Negative for dysuria, frequency, hematuria and urgency.   Musculoskeletal:  Positive for arthralgias and joint swelling. Negative for myalgias.   Skin:  Negative for rash.   Neurological:  Positive for dizziness and weakness. Negative for headaches.   Psychiatric/Behavioral:  The patient is nervous/anxious.         OBJECTIVE:   /84   Pulse 104   Resp 18   Ht 1.613 m (5' 3.5\")   LMP 04/01/2023 (Approximate)   SpO2 97%   BMI 17.75 kg/m    Physical Exam  GENERAL: healthy, alert and no distress  EYES: Eyes grossly normal to inspection, PERRL and conjunctivae and sclerae normal. EOMI, several beats of horizontal nystagmus noted with far lateral gaze in both directions   RESP: lungs clear to auscultation - no rales, rhonchi or wheezes  BREAST: normal without masses, tenderness or nipple discharge and no palpable axillary masses or adenopathy  CV: regular rate and rhythm, normal S1 S2, no S3 or S4, no murmur, click or rub   MS: no gross musculoskeletal defects noted, no edema  SKIN: no suspicious lesions or rashes  NEURO: Normal strength and tone, mentation intact and speech normal  PSYCH: mentation appears normal, affect normal/bright    Diagnostic Test Results:  Labs reviewed in Epic  Results for orders placed or performed in visit on 12/12/23   Prolactin     Status: Normal   Result Value Ref Range    Prolactin 20 4 - 23 ng/mL    Narrative    The generation of " "reference intervals for this test is currently based on binary male or female sex. If the electronic health record information indicates another gender identity or if Legal Sex is recorded as \"Unknown\", both male and female reference intervals are provided where applicable, and should be considered according to the individual's appropriate clinical context.   Lipid panel reflex to direct LDL Fasting     Status: Abnormal   Result Value Ref Range    Cholesterol 197 <200 mg/dL    Triglycerides 106 <150 mg/dL    Direct Measure HDL 64 >=40 mg/dL    LDL Cholesterol Calculated 112 (H) <=100 mg/dL    Non HDL Cholesterol 133 (H) <130 mg/dL    Patient Fasting > 8hrs? Unknown     Narrative    Cholesterol  Desirable:  <200 mg/dL    Triglycerides  Normal:  Less than 150 mg/dL  Borderline High:  150-199 mg/dL  High:  200-499 mg/dL  Very High:  Greater than or equal to 500 mg/dL    Direct Measure HDL  Female:  Greater than or equal to 50 mg/dL   Male:  Greater than or equal to 40 mg/dL    LDL Cholesterol  Desirable:  <100mg/dL  Above Desirable:  100-129 mg/dL   Borderline High:  130-159 mg/dL   High:  160-189 mg/dL   Very High:  >= 190 mg/dL    Non HDL Cholesterol  Desirable:  130 mg/dL  Above Desirable:  130-159 mg/dL  Borderline High:  160-189 mg/dL  High:  190-219 mg/dL  Very High:  Greater than or equal to 220 mg/dL   Glucose     Status: None   Result Value Ref Range    Glucose 84 70 - 99 mg/dL    Patient Fasting > 8hrs? Unknown    Vitamin B12     Status: Normal   Result Value Ref Range    Vitamin B12 386 232 - 1,245 pg/mL       ASSESSMENT/PLAN:   Toshia was seen today for physical, menstrual problem, tingling, eye problem, imm/inj and new patient.    Diagnoses and all orders for this visit:    Encounter to establish care  Chart reviewed, will continue to address chronic issues     Encounter for health maintenance examination in adult  Other screenings UTD. Immunizations as below. Healthy habits reviewed.   -     Lipid panel " reflex to direct LDL Fasting; Future  -     Glucose; Future  -     Lipid panel reflex to direct LDL Fasting  -     Glucose    Need for prophylactic vaccination and inoculation against influenza  -     INFLUENZA VACCINE IM > 6 MONTHS VALENT IIV4 (AFLURIA/FLUZONE)    High priority for 2019-nCoV vaccine  -     COVID-19 12+ (2023-24) (PFIZER)    Nystagmus  Diplopia  Only with far lateral gaze but bothersome to patient, will refer to ensure no extraocular muscle abnormalities   -     Adult Eye  Referral; Future    Hypermobile joint syndrome of multiple sites  Has seen Rheumatology. Pt suspects hEDS. Mentioned Dr Hyman as a possible consultant.     Paresthesia  Bothersome tingling. Pt vegetarian but some animal products. Further workup if persistant, may warrant EMG  -     Vitamin B12; Future  -     Vitamin B12    Elevated prolactin level  Follow up from previous mildly elevated level   -     Prolactin; Future  -     Prolactin    PMDD (premenstrual dysphoric disorder)  Abnormal uterine bleeding (AUB)  Still bothersome bleeding 2mos into new oral CHC. Has OB follow up in the coming month, will defer any changes at this time - hopefully will improve             COUNSELING:  Reviewed preventive health counseling, as reflected in patient instructions        She reports that she has never smoked. She has never used smokeless tobacco.      Heather Elias DO  [facility]

## 2023-12-27 PROBLEM — F90.0 ATTENTION DEFICIT HYPERACTIVITY DISORDER (ADHD), PREDOMINANTLY INATTENTIVE TYPE: Status: ACTIVE | Noted: 2023-12-27

## 2023-12-27 PROBLEM — F43.12 CHRONIC POST-TRAUMATIC STRESS DISORDER (PTSD): Status: ACTIVE | Noted: 2020-01-17

## 2023-12-27 PROBLEM — Q79.62 EHLERS-DANLOS, HYPERMOBILE TYPE: Status: ACTIVE | Noted: 2020-07-13

## 2023-12-27 PROBLEM — L40.9 PSORIASIS: Status: ACTIVE | Noted: 2023-12-27

## 2023-12-27 PROBLEM — M24.80 HYPERMOBILE JOINT SYNDROME OF MULTIPLE SITES: Status: ACTIVE | Noted: 2020-07-13

## 2023-12-27 PROBLEM — G90.A POSTURAL ORTHOSTATIC TACHYCARDIA SYNDROME (POTS): Status: ACTIVE | Noted: 2023-12-27

## 2023-12-27 NOTE — PATIENT INSTRUCTIONS
Great to meet you today  We'll plan to check a few labs, vitamin B12 to look for reasons for tingling and follow up on the prolactin     Follow up in 1-2 months to continue discussing and making plans to address your chronic mental health conditions     Message or call w/ any questions!     Preventive Health Recommendations  Ages 26 - 39  Yearly exam:   See your health care provider every year in order to  Review health changes.   Discuss preventive care.    Review your medicines if you your doctor has prescribed any.    Until age 30: Get a Pap test every three years (more often if you have had an abnormal result).    After age 30: Talk to your doctor about whether you should have a Pap test every 3 years or have a Pap test with HPV screening every 5 years.   You do not need a Pap test if your uterus was removed (hysterectomy) and you have not had cancer.  You should be tested each year for STDs (sexually transmitted diseases), if you're at risk.   Talk to your provider about how often to have your cholesterol checked.  If you are at risk for diabetes, you should have a diabetes test (fasting glucose).  Shots: Get a flu shot each year. Get a tetanus shot every 10 years.   Nutrition:   Eat at least 5 servings of fruits and vegetables each day.  Eat whole-grain bread, whole-wheat pasta and brown rice instead of white grains and rice.  Get adequate Calcium and Vitamin D.     Lifestyle  Exercise at least 150 minutes a week (30 minutes a day, 5 days of the week). This will help you control your weight and prevent disease.  Limit alcohol to one drink per day.  No smoking.   Wear sunscreen to prevent skin cancer.  See your dentist every six months for an exam and cleaning.

## 2024-01-16 ENCOUNTER — OFFICE VISIT (OUTPATIENT)
Dept: OBGYN | Facility: CLINIC | Age: 29
End: 2024-01-16
Attending: OBSTETRICS & GYNECOLOGY
Payer: COMMERCIAL

## 2024-01-16 VITALS
BODY MASS INDEX: 17.47 KG/M2 | HEIGHT: 64 IN | DIASTOLIC BLOOD PRESSURE: 83 MMHG | HEART RATE: 85 BPM | SYSTOLIC BLOOD PRESSURE: 120 MMHG

## 2024-01-16 DIAGNOSIS — N93.9 ABNORMAL UTERINE BLEEDING (AUB): Primary | ICD-10-CM

## 2024-01-16 PROCEDURE — 99213 OFFICE O/P EST LOW 20 MIN: CPT | Performed by: OBSTETRICS & GYNECOLOGY

## 2024-01-16 PROCEDURE — 99203 OFFICE O/P NEW LOW 30 MIN: CPT | Performed by: OBSTETRICS & GYNECOLOGY

## 2024-01-16 RX ORDER — ESTRADIOL 1 MG/1
1 TABLET ORAL DAILY
Qty: 14 TABLET | Refills: 3 | Status: SHIPPED | OUTPATIENT
Start: 2024-01-16

## 2024-01-16 NOTE — LETTER
1/16/2024       RE: Toshia Claros  [address]  [facility] [address]     Dear Colleague,    Thank you for referring your patient, Toshia Claros, to the [facility] [place] [facility]. Please see a copy of my visit note below.    Chief Complaint   Patient presents with    Physical     GYN check-up    Laquita Romero LPN     CC/HPI:   Toshia Claros is a 28 year old adult P0 who presents for evaluation of AUB on continuous OCP as well as significant PMDD. They were started on continuous OCP for management of PMDD in 5/23. Since that time they have noted persistent, daily bleeding which is most often spotting. When bleeding increases they note mood is worsened to the point of suicidal ideation. They have close management with therapist who is present with them today at their visit. The continuous OCP has not made notable different in PMDD either. They have trouble taking daily psych meds, though are able to take daily OCP and do not feel they have missed pills. They have not tried other forms of hormonal management for bleeding. Not sexually active and no other vaginal concerns. Currently being evaluated/managed for likely POTS and possible hypermobile EDS.     HISTORIES:  Patient Active Problem List   Diagnosis    Pelvic pain in female    Chronic post-traumatic stress disorder (PTSD)    Anorexia    Hypermobile joint syndrome of multiple sites    Chronic idiopathic constipation    Irritable bowel syndrome with both constipation and diarrhea    Attention deficit hyperactivity disorder (ADHD), predominantly inattentive type    Dysthymia    Anorexia nervosa (H28)    Major depression, single episode    Postural orthostatic tachycardia syndrome (POTS)    Psoriasis       Current Outpatient Medications   Medication Sig Dispense Refill    amphetamine-dextroamphetamine (ADDERALL) 15 MG tablet Take 15 mg by mouth 2 times daily       cloNIDine (CATAPRES) 0.1 MG tablet Take 0.1 mg by mouth daily      estradiol (ESTRACE) 1 MG tablet Take 1 tablet (1 mg) by mouth daily 14 tablet 3    fluocinonide (LIDEX) 0.05 % external ointment Apply topically 2 times daily      fluocinonide (LIDEX) 0.05 % external solution Apply topically daily      ivabradine (CORLANOR) 5 MG tablet Take 5 mg by mouth 2 times daily      levonorgestrel-ethinyl estradiol (SEASONALE) 0.15-0.03 MG tablet Take 1 tablet by mouth daily 91 tablet 4    LINZESS 290 MCG capsule Take 290 mcg by mouth every morning (before breakfast)      lubiprostone (AMITIZA) 24 MCG capsule Take 1 capsule by mouth daily      metoclopramide (REGLAN) 10 MG tablet Take 10 mg by mouth daily as needed      midodrine (PROAMATINE) 5 MG tablet Take 5 mg by mouth 2 times daily      mirtazapine (REMERON) 7.5 MG tablet Take 7.5 mg by mouth at bedtime      MOTEGRITY 2 MG tablet Take 2 mg by mouth daily      OLANZapine (ZYPREXA) 2.5 MG tablet Take 2.5 mg by mouth at bedtime      prazosin (MINIPRESS) 2 MG capsule Take 2 mg by mouth at bedtime      prochlorperazine (COMPAZINE) 5 MG tablet Take 5 mg by mouth daily as needed for nausea or vomiting      promethazine (PHENERGAN) 12.5 MG tablet Take 12.5 mg by mouth       No Known Allergies  Social History     Socioeconomic History    Marital status: Single     Spouse name: Not on file    Number of children: Not on file    Years of education: Not on file    Highest education level: Not on file   Occupational History     Comment: [employer], [employer] major, planning on applying to med school in June   Tobacco Use    Smoking status: Never    Smokeless tobacco: Never   Vaping Use    Vaping Use: Never used   Substance and Sexual Activity    Alcohol use: No    Drug use: No    Sexual activity: Not Currently     Partners: Male     Birth control/protection: Abstinence   Other Topics Concern    Parent/sibling w/ CABG, MI or angioplasty before 65F 55M? Not Asked   Social History Narrative     Not on file     Social Determinants of Health     Financial Resource Strain: Low Risk  (1/17/2024)    Financial Resource Strain     Within the past 12 months, have you or your family members you live with been unable to get utilities (heat, electricity) when it was really needed?: No   Food Insecurity: Low Risk  (1/17/2024)    Food Insecurity     Within the past 12 months, did you worry that your food would run out before you got money to buy more?: No     Within the past 12 months, did the food you bought just not last and you didn t have money to get more?: No   Transportation Needs: Low Risk  (1/17/2024)    Transportation Needs     Within the past 12 months, has lack of transportation kept you from medical appointments, getting your medicines, non-medical meetings or appointments, work, or from getting things that you need?: No   Physical Activity: Not on file   Stress: Not on file   Social Connections: Not on file   Interpersonal Safety: Low Risk  (12/12/2023)    Interpersonal Safety     Do you feel physically and emotionally safe where you currently live?: Yes     Within the past 12 months, have you been hit, slapped, kicked or otherwise physically hurt by someone?: No     Within the past 12 months, have you been humiliated or emotionally abused in other ways by your partner or ex-partner?: No   Housing Stability: Low Risk  (1/17/2024)    Housing Stability     Do you have housing? : Yes     Are you worried about losing your housing?: No     Family History   Adopted: Yes   Family history unknown: Yes          Gyn Hx:   Menses as above  Last pap 10/23 NIL      Review Of Systems:  CONSTITUTIONAL: NEGATIVE for fever, chills  EYES: NEGATIVE for vision changes   RESP: NEGATIVE for significant cough or SOB  CV: NEGATIVE for chest pain, palpitations   GI: NEGATIVE for nausea, abdominal pain, heartburn, or change in bowel habits  : NEGATIVE for frequency, dysuria, or hematuria  MUSCULOSKELETAL: NEGATIVE for  "significant arthralgias or myalgia    EXAM:  /83   Pulse 85   Ht 1.626 m (5' 4\")   BMI 17.47 kg/m    Body mass index is 17.47 kg/m .    General - withdrawn appearing female in no distress. MInimally engaging in conversation.   Skin - no suspicious lesions or rashes  Musculoskeletal - no gross deformities.  Neurological - normal strength, sensation, and mental status.  Pelvic - EG: normal  female, vulva reveals no erythema or lesions.   BUS: within normal limits.  Vagina: well rugated, small area of possible erosion vs abnormal appearing rugae anterior fornix treated with silver nitrate. Scan blood in vaginal vault.  Cervix: no lesions, polyps discharge or CMT. No ectropion.       ASSESSMENT/PLAN  28 year old P0 with persistent bleeding on continuous OCP. Reviewed possible etiologies including structural- polyp, endometrial atrophy, chronic endometritis. No evidence of cervical cause. Did have small area anterior fornix with possible bleeding treated with silver nitrate. Otherwise presume likely endometrial atrophy. Will do short course of oral estrace to see if improves bleeding. If not will plan for pelvic US and option of course of doxycycline.     Discussed that alternative option would be to stop continuous OCP as it is not improving PMDD and consider other form of menstrual suppression. Recommend following up with mental health provider to discuss other management for PMDD including luteal phase support.     Will mychart or call to discuss management of bleeding and consideration of alternative options if not improved.     Starr Mabry MD  "

## 2024-01-16 NOTE — PATIENT INSTRUCTIONS
Thank you for trusting us with your care!     If you need to contact us for questions about:  Symptoms, Scheduling & Medical Questions; Non-urgent (2-3 day response) Rico message, Urgent (needing response today) [phone] (if after 3:30pm next day response)   Prescriptions: Please call your Pharmacy   Billing: [place] [phone] or [facility]:[phone]

## 2024-01-17 NOTE — PROGRESS NOTES
CC/HPI:   Toshia Claros is a 28 year old adult P0 who presents for evaluation of AUB on continuous OCP as well as significant PMDD. They were started on continuous OCP for management of PMDD in 5/23. Since that time they have noted persistent, daily bleeding which is most often spotting. When bleeding increases they note mood is worsened to the point of suicidal ideation. They have close management with therapist who is present with them today at their visit. The continuous OCP has not made notable different in PMDD either. They have trouble taking daily psych meds, though are able to take daily OCP and do not feel they have missed pills. They have not tried other forms of hormonal management for bleeding. Not sexually active and no other vaginal concerns. Currently being evaluated/managed for likely POTS and possible hypermobile EDS.     HISTORIES:  Patient Active Problem List   Diagnosis    Pelvic pain in female    Chronic post-traumatic stress disorder (PTSD)    Anorexia    Hypermobile joint syndrome of multiple sites    Chronic idiopathic constipation    Irritable bowel syndrome with both constipation and diarrhea    Attention deficit hyperactivity disorder (ADHD), predominantly inattentive type    Dysthymia    Anorexia nervosa (H28)    Major depression, single episode    Postural orthostatic tachycardia syndrome (POTS)    Psoriasis       Current Outpatient Medications   Medication Sig Dispense Refill    amphetamine-dextroamphetamine (ADDERALL) 15 MG tablet Take 15 mg by mouth 2 times daily      cloNIDine (CATAPRES) 0.1 MG tablet Take 0.1 mg by mouth daily      estradiol (ESTRACE) 1 MG tablet Take 1 tablet (1 mg) by mouth daily 14 tablet 3    fluocinonide (LIDEX) 0.05 % external ointment Apply topically 2 times daily      fluocinonide (LIDEX) 0.05 % external solution Apply topically daily      ivabradine (CORLANOR) 5 MG tablet Take 5 mg by mouth 2 times daily      levonorgestrel-ethinyl estradiol (SEASONALE)  0.15-0.03 MG tablet Take 1 tablet by mouth daily 91 tablet 4    LINZESS 290 MCG capsule Take 290 mcg by mouth every morning (before breakfast)      lubiprostone (AMITIZA) 24 MCG capsule Take 1 capsule by mouth daily      metoclopramide (REGLAN) 10 MG tablet Take 10 mg by mouth daily as needed      midodrine (PROAMATINE) 5 MG tablet Take 5 mg by mouth 2 times daily      mirtazapine (REMERON) 7.5 MG tablet Take 7.5 mg by mouth at bedtime      MOTEGRITY 2 MG tablet Take 2 mg by mouth daily      OLANZapine (ZYPREXA) 2.5 MG tablet Take 2.5 mg by mouth at bedtime      prazosin (MINIPRESS) 2 MG capsule Take 2 mg by mouth at bedtime      prochlorperazine (COMPAZINE) 5 MG tablet Take 5 mg by mouth daily as needed for nausea or vomiting      promethazine (PHENERGAN) 12.5 MG tablet Take 12.5 mg by mouth       No Known Allergies  Social History     Socioeconomic History    Marital status: Single     Spouse name: Not on file    Number of children: Not on file    Years of education: Not on file    Highest education level: Not on file   Occupational History     Comment: [employer] major, planning on applying to med school in June   Tobacco Use    Smoking status: Never    Smokeless tobacco: Never   Vaping Use    Vaping Use: Never used   Substance and Sexual Activity    Alcohol use: No    Drug use: No    Sexual activity: Not Currently     Partners: Male     Birth control/protection: Abstinence   Other Topics Concern    Parent/sibling w/ CABG, MI or angioplasty before 65F 55M? Not Asked   Social History Narrative    Not on file     Social Determinants of Health     Financial Resource Strain: Low Risk  (1/17/2024)    Financial Resource Strain     Within the past 12 months, have you or your family members you live with been unable to get utilities (heat, electricity) when it was really needed?: No   Food Insecurity: Low Risk  (1/17/2024)    Food Insecurity     Within the past 12 months, did you worry that your food would run out  "before you got money to buy more?: No     Within the past 12 months, did the food you bought just not last and you didn t have money to get more?: No   Transportation Needs: Low Risk  (1/17/2024)    Transportation Needs     Within the past 12 months, has lack of transportation kept you from medical appointments, getting your medicines, non-medical meetings or appointments, work, or from getting things that you need?: No   Physical Activity: Not on file   Stress: Not on file   Social Connections: Not on file   Interpersonal Safety: Low Risk  (12/12/2023)    Interpersonal Safety     Do you feel physically and emotionally safe where you currently live?: Yes     Within the past 12 months, have you been hit, slapped, kicked or otherwise physically hurt by someone?: No     Within the past 12 months, have you been humiliated or emotionally abused in other ways by your partner or ex-partner?: No   Housing Stability: Low Risk  (1/17/2024)    Housing Stability     Do you have housing? : Yes     Are you worried about losing your housing?: No     Family History   Adopted: Yes   Family history unknown: Yes          Gyn Hx:   Menses as above  Last pap 10/23 NIL      Review Of Systems:  CONSTITUTIONAL: NEGATIVE for fever, chills  EYES: NEGATIVE for vision changes   RESP: NEGATIVE for significant cough or SOB  CV: NEGATIVE for chest pain, palpitations   GI: NEGATIVE for nausea, abdominal pain, heartburn, or change in bowel habits  : NEGATIVE for frequency, dysuria, or hematuria  MUSCULOSKELETAL: NEGATIVE for significant arthralgias or myalgia    EXAM:  /83   Pulse 85   Ht 1.626 m (5' 4\")   BMI 17.47 kg/m    Body mass index is 17.47 kg/m .    General - withdrawn appearing female in no distress. MInimally engaging in conversation.   Skin - no suspicious lesions or rashes  Musculoskeletal - no gross deformities.  Neurological - normal strength, sensation, and mental status.  Pelvic - EG: normal  female, vulva reveals no " erythema or lesions.   BUS: within normal limits.  Vagina: well rugated, small area of possible erosion vs abnormal appearing rugae anterior fornix treated with silver nitrate. Scan blood in vaginal vault.  Cervix: no lesions, polyps discharge or CMT. No ectropion.       ASSESSMENT/PLAN  28 year old P0 with persistent bleeding on continuous OCP. Reviewed possible etiologies including structural- polyp, endometrial atrophy, chronic endometritis. No evidence of cervical cause. Did have small area anterior fornix with possible bleeding treated with silver nitrate. Otherwise presume likely endometrial atrophy. Will do short course of oral estrace to see if improves bleeding. If not will plan for pelvic US and option of course of doxycycline.     Discussed that alternative option would be to stop continuous OCP as it is not improving PMDD and consider other form of menstrual suppression. Recommend following up with mental health provider to discuss other management for PMDD including luteal phase support.     Will mychart or call to discuss management of bleeding and consideration of alternative options if not improved.     Starr Mabry MD

## 2024-01-18 ENCOUNTER — OFFICE VISIT (OUTPATIENT)
Dept: OPHTHALMOLOGY | Facility: CLINIC | Age: 29
End: 2024-01-18
Attending: STUDENT IN AN ORGANIZED HEALTH CARE EDUCATION/TRAINING PROGRAM
Payer: COMMERCIAL

## 2024-01-18 DIAGNOSIS — H50.05 ALTERNATING ESOTROPIA: ICD-10-CM

## 2024-01-18 DIAGNOSIS — H02.88B MEIBOMIAN GLAND DYSFUNCTION (MGD), BILATERAL, BOTH UPPER AND LOWER LIDS: ICD-10-CM

## 2024-01-18 DIAGNOSIS — H55.09 ROTARY NYSTAGMUS: Primary | ICD-10-CM

## 2024-01-18 DIAGNOSIS — H02.88A MEIBOMIAN GLAND DYSFUNCTION (MGD), BILATERAL, BOTH UPPER AND LOWER LIDS: ICD-10-CM

## 2024-01-18 PROCEDURE — 92060 SENSORIMOTOR EXAMINATION: CPT | Performed by: OPHTHALMOLOGY

## 2024-01-18 PROCEDURE — 92004 COMPRE OPH EXAM NEW PT 1/>: CPT | Performed by: OPHTHALMOLOGY

## 2024-01-18 PROCEDURE — 92060 SENSORIMOTOR EXAMINATION: CPT

## 2024-01-18 PROCEDURE — 99213 OFFICE O/P EST LOW 20 MIN: CPT | Performed by: OPHTHALMOLOGY

## 2024-01-18 ASSESSMENT — CONF VISUAL FIELD
METHOD: COUNTING FINGERS
OS_NORMAL: 1
OD_INFERIOR_TEMPORAL_RESTRICTION: 0
OD_SUPERIOR_TEMPORAL_RESTRICTION: 0
OS_INFERIOR_TEMPORAL_RESTRICTION: 0
OD_SUPERIOR_NASAL_RESTRICTION: 0
OD_INFERIOR_NASAL_RESTRICTION: 0
OS_INFERIOR_NASAL_RESTRICTION: 0
OD_NORMAL: 1
OS_SUPERIOR_NASAL_RESTRICTION: 0
OS_SUPERIOR_TEMPORAL_RESTRICTION: 0

## 2024-01-18 ASSESSMENT — REFRACTION_WEARINGRX
OD_SPHERE: -2.50
OS_AXIS: 125
SPECS_TYPE: SVL
OS_CYLINDER: +0.75
OD_AXIS: 165
OS_SPHERE: -2.75
OD_CYLINDER: +0.75

## 2024-01-18 ASSESSMENT — CUP TO DISC RATIO
OD_RATIO: 0.4
OS_RATIO: 0.35

## 2024-01-18 ASSESSMENT — VISUAL ACUITY
OD_CC+: -1
CORRECTION_TYPE: CONTACTS
OS_CC+: -1
METHOD: SNELLEN - LINEAR
OS_CC: 20/25
OD_CC: 20/25

## 2024-01-18 ASSESSMENT — EXTERNAL EXAM - RIGHT EYE: OD_EXAM: NORMAL

## 2024-01-18 ASSESSMENT — TONOMETRY
IOP_METHOD: ICARE
OD_IOP_MMHG: 18
OS_IOP_MMHG: 16

## 2024-01-18 ASSESSMENT — EXTERNAL EXAM - LEFT EYE: OS_EXAM: NORMAL

## 2024-01-18 NOTE — NURSING NOTE
"Chief Complaints and History of Present Illnesses   Patient presents with    Nystagmus Evaluation     Referred by Heather Elias DO     Chief Complaint(s) and History of Present Illness(es)       Nystagmus Evaluation              Comments: Referred by Heather Elias DO              Comments    Pt states \"that she gets nystagmus when looking to both sides\"  States she gets double vision when looking to the side both temporally and nasally   States no flashes, floaters or eye pain   Pt atates she gi=ot new NR and contacts 9/23    Juany Ching COT 9:39 AM January 18, 2024                          "

## 2024-01-18 NOTE — PATIENT INSTRUCTIONS
Cleansing the eye  Hot Compresses: perform 2 times daily, or as ordered by your doctor      A.  Soak clean washcloth in hot water  OR      B.  Commercially available hot pack  OR      C.  Dry rice in a clean sock (dress sock or other thin sock is best), microwave until hot (20-30 seconds)   Cleansing the eye  Hot Compresses: perform 2 times daily, or as ordered by your doctor  Soak clean washcloth in hot water  OR  Commercially available hot pack  OR  Dry rice in a clean sock (dress sock or other thin sock is best), microwave until hot (20-30 seconds)     - Place hot compress on closed eyelid for 3-5 minutes (make sure not too hot)   - Gently massage eyelids for 30 seconds    Eyelid Scrubs:   Make solution with   water +   baby shampoo OR  In shower: place baby shampoo on fingertips OR  Steri-Lid cleansing solution    -  Use solution of choice on fingertips to  brush  your eyelids (like brushing teeth) for 30 seconds  -  Rinse eyelids and eyelashes with clean water  - Wipe dry with clean, dry cloth (jose cloth is best)

## 2024-01-18 NOTE — PROGRESS NOTES
"Chief Complaint(s) and History of Present Illness(es)     Nystagmus Evaluation            Comments: Referred by Heather Elias DO          Comments    Pt states \"that she gets nystagmus when looking to both sides\"  States she gets double vision when looking to the side both temporally and   nasally   States no flashes, floaters or eye pain   Pt states she got new NR and contacts 9/23    Juany Ching COT 9:39 AM January 18, 2024          Review of systems for the eyes was negative other than the pertinent positives/negatives listed in the HPI.      Assessment & Plan      Toshia Claros is a 28 year old adult with the following diagnoses:   1. Rotary nystagmus    2. Alternating esotropia    3. Meibomian gland dysfunction (MGD), bilateral, both upper and lower lids       Symptoms > 1 year, no significant worsening or improvement since first noted  Feels there is occasional diplopia when looking to the sides or up as well as when she is \"relaxing her eyes.\"  Typically goes away when covering an eye, but not always.  No previous treatments    Sensorimotor testing today with good primary alignment.  Mild esotropia in lateral gaze  Rotary nystagmus in all directions of gaze.  None seen in primary  Denies vertigo  Oily tear film in the setting of meibomian gland dysfunction    Discussed mulifactorial etiology for symptoms  Suspect medication side effect related (?zyprexa)  Recommended to start warm compresses and lid hygiene  Refer to neuro-ophthalmology for further eval/treatment         Attending Physician Attestation:  Complete documentation of historical and exam elements from today's encounter can be found in the full encounter summary report (not reduplicated in this progress note).  I personally obtained the chief complaint(s) and history of present illness.  I confirmed and edited as necessary the review of systems, past medical/surgical history, family history, social history, and examination findings as " documented by others; and I examined the patient myself.  I personally reviewed the relevant tests, images, and reports as documented above.  I formulated and edited as necessary the assessment and plan and discussed the findings and management plan with the patient and family. . - Brice Ching MD

## 2025-02-01 ENCOUNTER — HEALTH MAINTENANCE LETTER (OUTPATIENT)
Age: 30
End: 2025-02-01